# Patient Record
Sex: FEMALE | Race: OTHER | NOT HISPANIC OR LATINO | URBAN - METROPOLITAN AREA
[De-identification: names, ages, dates, MRNs, and addresses within clinical notes are randomized per-mention and may not be internally consistent; named-entity substitution may affect disease eponyms.]

---

## 2021-09-29 ENCOUNTER — EMERGENCY (EMERGENCY)
Facility: HOSPITAL | Age: 31
LOS: 0 days | Discharge: HOME | End: 2021-09-29
Attending: EMERGENCY MEDICINE | Admitting: EMERGENCY MEDICINE
Payer: SELF-PAY

## 2021-09-29 VITALS
DIASTOLIC BLOOD PRESSURE: 75 MMHG | TEMPERATURE: 98 F | SYSTOLIC BLOOD PRESSURE: 128 MMHG | RESPIRATION RATE: 18 BRPM | HEART RATE: 78 BPM | OXYGEN SATURATION: 99 %

## 2021-09-29 VITALS
OXYGEN SATURATION: 100 % | WEIGHT: 119.05 LBS | SYSTOLIC BLOOD PRESSURE: 130 MMHG | HEART RATE: 80 BPM | TEMPERATURE: 98 F | DIASTOLIC BLOOD PRESSURE: 62 MMHG | HEIGHT: 66.54 IN | RESPIRATION RATE: 18 BRPM

## 2021-09-29 DIAGNOSIS — Z20.822 CONTACT WITH AND (SUSPECTED) EXPOSURE TO COVID-19: ICD-10-CM

## 2021-09-29 DIAGNOSIS — Z87.19 PERSONAL HISTORY OF OTHER DISEASES OF THE DIGESTIVE SYSTEM: ICD-10-CM

## 2021-09-29 DIAGNOSIS — R10.9 UNSPECIFIED ABDOMINAL PAIN: ICD-10-CM

## 2021-09-29 DIAGNOSIS — R10.11 RIGHT UPPER QUADRANT PAIN: ICD-10-CM

## 2021-09-29 DIAGNOSIS — R11.0 NAUSEA: ICD-10-CM

## 2021-09-29 DIAGNOSIS — R10.13 EPIGASTRIC PAIN: ICD-10-CM

## 2021-09-29 LAB
ALBUMIN SERPL ELPH-MCNC: 4.1 G/DL — SIGNIFICANT CHANGE UP (ref 3.5–5.2)
ALP SERPL-CCNC: 53 U/L — SIGNIFICANT CHANGE UP (ref 30–115)
ALT FLD-CCNC: 6 U/L — SIGNIFICANT CHANGE UP (ref 0–41)
ANION GAP SERPL CALC-SCNC: 11 MMOL/L — SIGNIFICANT CHANGE UP (ref 7–14)
APPEARANCE UR: CLEAR — SIGNIFICANT CHANGE UP
APTT BLD: 34.7 SEC — SIGNIFICANT CHANGE UP (ref 27–39.2)
AST SERPL-CCNC: 13 U/L — SIGNIFICANT CHANGE UP (ref 0–41)
BASOPHILS # BLD AUTO: 0.04 K/UL — SIGNIFICANT CHANGE UP (ref 0–0.2)
BASOPHILS NFR BLD AUTO: 0.7 % — SIGNIFICANT CHANGE UP (ref 0–1)
BILIRUB SERPL-MCNC: 0.3 MG/DL — SIGNIFICANT CHANGE UP (ref 0.2–1.2)
BILIRUB UR-MCNC: NEGATIVE — SIGNIFICANT CHANGE UP
BUN SERPL-MCNC: 8 MG/DL — LOW (ref 10–20)
CALCIUM SERPL-MCNC: 9.3 MG/DL — SIGNIFICANT CHANGE UP (ref 8.5–10.1)
CHLORIDE SERPL-SCNC: 107 MMOL/L — SIGNIFICANT CHANGE UP (ref 98–110)
CO2 SERPL-SCNC: 23 MMOL/L — SIGNIFICANT CHANGE UP (ref 17–32)
COLOR SPEC: YELLOW — SIGNIFICANT CHANGE UP
CREAT SERPL-MCNC: 0.7 MG/DL — SIGNIFICANT CHANGE UP (ref 0.7–1.5)
DIFF PNL FLD: NEGATIVE — SIGNIFICANT CHANGE UP
EOSINOPHIL # BLD AUTO: 0.2 K/UL — SIGNIFICANT CHANGE UP (ref 0–0.7)
EOSINOPHIL NFR BLD AUTO: 3.4 % — SIGNIFICANT CHANGE UP (ref 0–8)
GLUCOSE SERPL-MCNC: 83 MG/DL — SIGNIFICANT CHANGE UP (ref 70–99)
GLUCOSE UR QL: NEGATIVE MG/DL — SIGNIFICANT CHANGE UP
HCG SERPL QL: NEGATIVE — SIGNIFICANT CHANGE UP
HCT VFR BLD CALC: 33.9 % — LOW (ref 37–47)
HGB BLD-MCNC: 11 G/DL — LOW (ref 12–16)
IMM GRANULOCYTES NFR BLD AUTO: 0.2 % — SIGNIFICANT CHANGE UP (ref 0.1–0.3)
INR BLD: 1.06 RATIO — SIGNIFICANT CHANGE UP (ref 0.65–1.3)
KETONES UR-MCNC: NEGATIVE — SIGNIFICANT CHANGE UP
LEUKOCYTE ESTERASE UR-ACNC: NEGATIVE — SIGNIFICANT CHANGE UP
LIDOCAIN IGE QN: 33 U/L — SIGNIFICANT CHANGE UP (ref 7–60)
LYMPHOCYTES # BLD AUTO: 2.27 K/UL — SIGNIFICANT CHANGE UP (ref 1.2–3.4)
LYMPHOCYTES # BLD AUTO: 38.6 % — SIGNIFICANT CHANGE UP (ref 20.5–51.1)
MCHC RBC-ENTMCNC: 27.6 PG — SIGNIFICANT CHANGE UP (ref 27–31)
MCHC RBC-ENTMCNC: 32.4 G/DL — SIGNIFICANT CHANGE UP (ref 32–37)
MCV RBC AUTO: 85 FL — SIGNIFICANT CHANGE UP (ref 81–99)
MONOCYTES # BLD AUTO: 0.55 K/UL — SIGNIFICANT CHANGE UP (ref 0.1–0.6)
MONOCYTES NFR BLD AUTO: 9.4 % — HIGH (ref 1.7–9.3)
NEUTROPHILS # BLD AUTO: 2.81 K/UL — SIGNIFICANT CHANGE UP (ref 1.4–6.5)
NEUTROPHILS NFR BLD AUTO: 47.7 % — SIGNIFICANT CHANGE UP (ref 42.2–75.2)
NITRITE UR-MCNC: NEGATIVE — SIGNIFICANT CHANGE UP
NRBC # BLD: 0 /100 WBCS — SIGNIFICANT CHANGE UP (ref 0–0)
PH UR: 6.5 — SIGNIFICANT CHANGE UP (ref 5–8)
PLATELET # BLD AUTO: 175 K/UL — SIGNIFICANT CHANGE UP (ref 130–400)
POTASSIUM SERPL-MCNC: 4.1 MMOL/L — SIGNIFICANT CHANGE UP (ref 3.5–5)
POTASSIUM SERPL-SCNC: 4.1 MMOL/L — SIGNIFICANT CHANGE UP (ref 3.5–5)
PROT SERPL-MCNC: 6.1 G/DL — SIGNIFICANT CHANGE UP (ref 6–8)
PROT UR-MCNC: NEGATIVE MG/DL — SIGNIFICANT CHANGE UP
PROTHROM AB SERPL-ACNC: 12.2 SEC — SIGNIFICANT CHANGE UP (ref 9.95–12.87)
RBC # BLD: 3.99 M/UL — LOW (ref 4.2–5.4)
RBC # FLD: 12.4 % — SIGNIFICANT CHANGE UP (ref 11.5–14.5)
SARS-COV-2 RNA SPEC QL NAA+PROBE: SIGNIFICANT CHANGE UP
SODIUM SERPL-SCNC: 141 MMOL/L — SIGNIFICANT CHANGE UP (ref 135–146)
SP GR SPEC: >=1.03 (ref 1.01–1.03)
UROBILINOGEN FLD QL: 0.2 MG/DL — SIGNIFICANT CHANGE UP
WBC # BLD: 5.88 K/UL — SIGNIFICANT CHANGE UP (ref 4.8–10.8)
WBC # FLD AUTO: 5.88 K/UL — SIGNIFICANT CHANGE UP (ref 4.8–10.8)

## 2021-09-29 PROCEDURE — 93010 ELECTROCARDIOGRAM REPORT: CPT

## 2021-09-29 PROCEDURE — 99285 EMERGENCY DEPT VISIT HI MDM: CPT

## 2021-09-29 PROCEDURE — 76705 ECHO EXAM OF ABDOMEN: CPT | Mod: 26

## 2021-09-29 RX ORDER — FAMOTIDINE 10 MG/ML
20 INJECTION INTRAVENOUS DAILY
Refills: 0 | Status: DISCONTINUED | OUTPATIENT
Start: 2021-09-29 | End: 2021-09-29

## 2021-09-29 RX ADMIN — FAMOTIDINE 100 MILLIGRAM(S): 10 INJECTION INTRAVENOUS at 12:24

## 2021-09-29 NOTE — ED PROVIDER NOTE - PATIENT PORTAL LINK FT
You can access the FollowMyHealth Patient Portal offered by [facility] by registering at the following website: [email or web address]. By joining [facility]’s FollowMyHealth portal, you will also be able to view your health information using other applications (apps) compatible with our system.

## 2021-09-29 NOTE — ED PROVIDER NOTE - NS ED ROS FT
Review of Systems:  	•	CONSTITUTIONAL - no fever, no diaphoresis, no chills  	•	SKIN - no rash  	•	HEMATOLOGIC - no bleeding, no bruising    	•	RESPIRATORY - no shortness of breath, no cough  	•	CARDIAC - no chest pain, no palpitations  	•	GI  abd pain, no nausea, no vomiting, no diarrhea, no constipation  	•	GENITO-URINARY - no discharge, no dysuria; no hematuria, no increased urinary frequency  	•	MUSCULOSKELETAL - no joint paint, no swelling, no redness  	•	NEUROLOGIC - no weakness, no headache, no paresthesias, no LOC  	•	PSYCH - no anxiety, non suicidal, non homicidal, no hallucination, no depression

## 2021-09-29 NOTE — ED ADULT NURSE NOTE - NSHOSCREENINGQ1_ED_ALL_ED
[place] [address] [facility] [address]
[place]  [address]
Phone:  [phone]                    ELECTROCARDIOGRAM REPORT      
_______________________________________________________________________________
 
Name:       DEBI MOONEY            Room #:         170-3       ADM IN  
M.R.#:      [id number]     Account #:      [id number]  
Admission:  21    Attend Phys:    Melissa Castillo MD   
Discharge:              Date of Birth:  [birth year]  
                                                          Report #: [id number]
   [id number]
_______________________________________________________________________________
                         [facility] ED
                                       
Test Date:    2021               Test Time:    04:06:11
Pat Name:     DEBI MOONEY       Department:   
Patient ID:   [id number]            Room:         170
Gender:       F                        Technician:   CRISTIAN
:          1983               Requested By: Juve Liriano
Order Number: [id number] MD:   Mathew Barragan
                                 Measurements
Intervals                              Axis          
Rate:         89                       P:            152
NY:           153                      QRS:          -29
QRSD:         89                       T:            152
QT:           412                                    
QTc:          502                                    
                           Interpretive Statements
Sinus or ectopic atrial rhythm
Probable left atrial enlargement
Borderline left axis deviation
Anteroseptal infarct, age indeterminate
Prolonged QT interval
Lead(s) II were not used for morphology analysis
No previous ECG available for comparison
Electronically Signed On 2021 7:35:30 CDT by Mathew Barragan
[email or web address]
 
 
 
 
 
 
 
 
 
 
 
 
 
 
 
 
 
 
  <ELECTRONICALLY SIGNED>
   By: Mathew Barragan MD, [facility]    
  21     [address]35
D: 21 0406                           _____________________________________
T: 21 0406                           Mathew Barragan MD, [facility]      /EPI
No

## 2021-09-29 NOTE — ED PROVIDER NOTE - CLINICAL SUMMARY MEDICAL DECISION MAKING FREE TEXT BOX
evaluated for epigastric pain, RUQ us was nml. lab work was nml, symtpoms improved with pepcid. repeat exam shows nontender abd. Patient to be discharged from ED. Any available test results were discussed with patient and/or family. Verbal instructions given, including instructions to return to ED immediately for any new, worsening, or concerning symptoms. Patient endorsed understanding. Written discharge instructions additionally given, including follow-up plan.

## 2021-09-29 NOTE — ED PROVIDER NOTE - PHYSICAL EXAMINATION
--EXAM--  VITAL SIGNS: I have reviewed vs documented at present.  CONSTITUTIONAL: Well-developed; well-nourished; in no acute distress.   SKIN: Warm and dry, no acute rash.     NECK: Supple; non tender.  CARD: S1, S2, Regular rate and rhythm.   RESP: No wheezes, rales or rhonchi.  ABD: Normal bowel sounds; soft; non-distended; tenderness to epigastric   EXT: Normal ROM.   NEURO: Alert, oriented, grossly unremarkable. Strength 5/5 in all extremities. Sensation intact throughout.  PSYCH: Cooperative, appropriate.

## 2021-09-29 NOTE — ED PROVIDER NOTE - CARE PROVIDER_API CALL
Betsy Marie (MD)  Gastroenterology  [address] [place] [address]  Phone: [phone]  Fax: [phone]  Follow Up Time:

## 2021-09-29 NOTE — ED PROVIDER NOTE - ATTENDING CONTRIBUTION TO CARE
1 day of epigastric abd pain that is constant worse with eating, burning in sensation unrelieved with pepcid and omeprazole associated with nausea no fevers and no urariny complaints. exam shows mild epigastric tenderness. plan is to obtain ruq us and labs.

## 2021-09-29 NOTE — ED PROVIDER NOTE - OBJECTIVE STATEMENT
this is a 32 yo female presents to ed for evaluation of abdominal pain . patient states has history of gastritis and had endoscopy about 5 years ago.

## 2023-08-17 NOTE — ED PROVIDER NOTE - WET READ LAUNCH FT
Detail Level: Detailed Body Location Override (Optional - Billing Will Still Be Based On Selected Body Map Location If Applicable): left inferior forehead X Size Of Lesion In Cm (Optional): 0 Incorporate Mauc In Note: Yes There are no Wet Read(s) to document.

## 2024-01-08 ENCOUNTER — EMERGENCY (EMERGENCY)
Facility: HOSPITAL | Age: 34
LOS: 0 days | Discharge: HOME | End: 2024-01-09
Attending: EMERGENCY MEDICINE
Payer: SELF-PAY

## 2024-01-08 VITALS
TEMPERATURE: 98 F | RESPIRATION RATE: 20 BRPM | WEIGHT: 127.87 LBS | SYSTOLIC BLOOD PRESSURE: 126 MMHG | HEART RATE: 81 BPM | OXYGEN SATURATION: 99 % | DIASTOLIC BLOOD PRESSURE: 74 MMHG

## 2024-01-08 DIAGNOSIS — K21.9 GASTRO-ESOPHAGEAL REFLUX DISEASE WITHOUT ESOPHAGITIS: ICD-10-CM

## 2024-01-08 DIAGNOSIS — R45.851 SUICIDAL IDEATIONS: ICD-10-CM

## 2024-01-08 DIAGNOSIS — E03.9 HYPOTHYROIDISM, UNSPECIFIED: ICD-10-CM

## 2024-01-08 DIAGNOSIS — Z20.822 CONTACT WITH AND (SUSPECTED) EXPOSURE TO COVID-19: ICD-10-CM

## 2024-01-08 DIAGNOSIS — F42.9 OBSESSIVE-COMPULSIVE DISORDER, UNSPECIFIED: ICD-10-CM

## 2024-01-08 LAB
ALBUMIN SERPL ELPH-MCNC: 4.7 G/DL — SIGNIFICANT CHANGE UP (ref 3.5–5.2)
ALBUMIN SERPL ELPH-MCNC: 4.7 G/DL — SIGNIFICANT CHANGE UP (ref 3.5–5.2)
ALP SERPL-CCNC: 62 U/L — SIGNIFICANT CHANGE UP (ref 30–115)
ALP SERPL-CCNC: 62 U/L — SIGNIFICANT CHANGE UP (ref 30–115)
ALT FLD-CCNC: 6 U/L — SIGNIFICANT CHANGE UP (ref 0–41)
ALT FLD-CCNC: 6 U/L — SIGNIFICANT CHANGE UP (ref 0–41)
ANION GAP SERPL CALC-SCNC: 12 MMOL/L — SIGNIFICANT CHANGE UP (ref 7–14)
ANION GAP SERPL CALC-SCNC: 12 MMOL/L — SIGNIFICANT CHANGE UP (ref 7–14)
APAP SERPL-MCNC: <5 UG/ML — LOW (ref 10–30)
APAP SERPL-MCNC: <5 UG/ML — LOW (ref 10–30)
AST SERPL-CCNC: 15 U/L — SIGNIFICANT CHANGE UP (ref 0–41)
AST SERPL-CCNC: 15 U/L — SIGNIFICANT CHANGE UP (ref 0–41)
BASOPHILS # BLD AUTO: 0.06 K/UL — SIGNIFICANT CHANGE UP (ref 0–0.2)
BASOPHILS # BLD AUTO: 0.06 K/UL — SIGNIFICANT CHANGE UP (ref 0–0.2)
BASOPHILS NFR BLD AUTO: 0.7 % — SIGNIFICANT CHANGE UP (ref 0–1)
BASOPHILS NFR BLD AUTO: 0.7 % — SIGNIFICANT CHANGE UP (ref 0–1)
BILIRUB SERPL-MCNC: <0.2 MG/DL — SIGNIFICANT CHANGE UP (ref 0.2–1.2)
BILIRUB SERPL-MCNC: <0.2 MG/DL — SIGNIFICANT CHANGE UP (ref 0.2–1.2)
BUN SERPL-MCNC: 11 MG/DL — SIGNIFICANT CHANGE UP (ref 10–20)
BUN SERPL-MCNC: 11 MG/DL — SIGNIFICANT CHANGE UP (ref 10–20)
CALCIUM SERPL-MCNC: 9.1 MG/DL — SIGNIFICANT CHANGE UP (ref 8.4–10.5)
CALCIUM SERPL-MCNC: 9.1 MG/DL — SIGNIFICANT CHANGE UP (ref 8.4–10.5)
CHLORIDE SERPL-SCNC: 103 MMOL/L — SIGNIFICANT CHANGE UP (ref 98–110)
CHLORIDE SERPL-SCNC: 103 MMOL/L — SIGNIFICANT CHANGE UP (ref 98–110)
CO2 SERPL-SCNC: 26 MMOL/L — SIGNIFICANT CHANGE UP (ref 17–32)
CO2 SERPL-SCNC: 26 MMOL/L — SIGNIFICANT CHANGE UP (ref 17–32)
CREAT SERPL-MCNC: 0.8 MG/DL — SIGNIFICANT CHANGE UP (ref 0.7–1.5)
CREAT SERPL-MCNC: 0.8 MG/DL — SIGNIFICANT CHANGE UP (ref 0.7–1.5)
EGFR: 98 ML/MIN/1.73M2 — SIGNIFICANT CHANGE UP
EGFR: 98 ML/MIN/1.73M2 — SIGNIFICANT CHANGE UP
EOSINOPHIL # BLD AUTO: 0.7 K/UL — SIGNIFICANT CHANGE UP (ref 0–0.7)
EOSINOPHIL # BLD AUTO: 0.7 K/UL — SIGNIFICANT CHANGE UP (ref 0–0.7)
EOSINOPHIL NFR BLD AUTO: 7.8 % — SIGNIFICANT CHANGE UP (ref 0–8)
EOSINOPHIL NFR BLD AUTO: 7.8 % — SIGNIFICANT CHANGE UP (ref 0–8)
ETHANOL SERPL-MCNC: <10 MG/DL — SIGNIFICANT CHANGE UP
ETHANOL SERPL-MCNC: <10 MG/DL — SIGNIFICANT CHANGE UP
FLUAV AG NPH QL: SIGNIFICANT CHANGE UP
FLUAV AG NPH QL: SIGNIFICANT CHANGE UP
FLUBV AG NPH QL: SIGNIFICANT CHANGE UP
FLUBV AG NPH QL: SIGNIFICANT CHANGE UP
GLUCOSE SERPL-MCNC: 100 MG/DL — HIGH (ref 70–99)
GLUCOSE SERPL-MCNC: 100 MG/DL — HIGH (ref 70–99)
HCG SERPL QL: NEGATIVE — SIGNIFICANT CHANGE UP
HCG SERPL QL: NEGATIVE — SIGNIFICANT CHANGE UP
HCT VFR BLD CALC: 38.2 % — SIGNIFICANT CHANGE UP (ref 37–47)
HCT VFR BLD CALC: 38.2 % — SIGNIFICANT CHANGE UP (ref 37–47)
HGB BLD-MCNC: 12.8 G/DL — SIGNIFICANT CHANGE UP (ref 12–16)
HGB BLD-MCNC: 12.8 G/DL — SIGNIFICANT CHANGE UP (ref 12–16)
IMM GRANULOCYTES NFR BLD AUTO: 0.1 % — SIGNIFICANT CHANGE UP (ref 0.1–0.3)
IMM GRANULOCYTES NFR BLD AUTO: 0.1 % — SIGNIFICANT CHANGE UP (ref 0.1–0.3)
LYMPHOCYTES # BLD AUTO: 3.23 K/UL — SIGNIFICANT CHANGE UP (ref 1.2–3.4)
LYMPHOCYTES # BLD AUTO: 3.23 K/UL — SIGNIFICANT CHANGE UP (ref 1.2–3.4)
LYMPHOCYTES # BLD AUTO: 35.9 % — SIGNIFICANT CHANGE UP (ref 20.5–51.1)
LYMPHOCYTES # BLD AUTO: 35.9 % — SIGNIFICANT CHANGE UP (ref 20.5–51.1)
MCHC RBC-ENTMCNC: 29.1 PG — SIGNIFICANT CHANGE UP (ref 27–31)
MCHC RBC-ENTMCNC: 29.1 PG — SIGNIFICANT CHANGE UP (ref 27–31)
MCHC RBC-ENTMCNC: 33.5 G/DL — SIGNIFICANT CHANGE UP (ref 32–37)
MCHC RBC-ENTMCNC: 33.5 G/DL — SIGNIFICANT CHANGE UP (ref 32–37)
MCV RBC AUTO: 86.8 FL — SIGNIFICANT CHANGE UP (ref 81–99)
MCV RBC AUTO: 86.8 FL — SIGNIFICANT CHANGE UP (ref 81–99)
MONOCYTES # BLD AUTO: 0.46 K/UL — SIGNIFICANT CHANGE UP (ref 0.1–0.6)
MONOCYTES # BLD AUTO: 0.46 K/UL — SIGNIFICANT CHANGE UP (ref 0.1–0.6)
MONOCYTES NFR BLD AUTO: 5.1 % — SIGNIFICANT CHANGE UP (ref 1.7–9.3)
MONOCYTES NFR BLD AUTO: 5.1 % — SIGNIFICANT CHANGE UP (ref 1.7–9.3)
NEUTROPHILS # BLD AUTO: 4.53 K/UL — SIGNIFICANT CHANGE UP (ref 1.4–6.5)
NEUTROPHILS # BLD AUTO: 4.53 K/UL — SIGNIFICANT CHANGE UP (ref 1.4–6.5)
NEUTROPHILS NFR BLD AUTO: 50.4 % — SIGNIFICANT CHANGE UP (ref 42.2–75.2)
NEUTROPHILS NFR BLD AUTO: 50.4 % — SIGNIFICANT CHANGE UP (ref 42.2–75.2)
NRBC # BLD: 0 /100 WBCS — SIGNIFICANT CHANGE UP (ref 0–0)
NRBC # BLD: 0 /100 WBCS — SIGNIFICANT CHANGE UP (ref 0–0)
PLATELET # BLD AUTO: 178 K/UL — SIGNIFICANT CHANGE UP (ref 130–400)
PLATELET # BLD AUTO: 178 K/UL — SIGNIFICANT CHANGE UP (ref 130–400)
PMV BLD: 12.1 FL — HIGH (ref 7.4–10.4)
PMV BLD: 12.1 FL — HIGH (ref 7.4–10.4)
POTASSIUM SERPL-MCNC: 3.5 MMOL/L — SIGNIFICANT CHANGE UP (ref 3.5–5)
POTASSIUM SERPL-MCNC: 3.5 MMOL/L — SIGNIFICANT CHANGE UP (ref 3.5–5)
POTASSIUM SERPL-SCNC: 3.5 MMOL/L — SIGNIFICANT CHANGE UP (ref 3.5–5)
POTASSIUM SERPL-SCNC: 3.5 MMOL/L — SIGNIFICANT CHANGE UP (ref 3.5–5)
PROT SERPL-MCNC: 6.5 G/DL — SIGNIFICANT CHANGE UP (ref 6–8)
PROT SERPL-MCNC: 6.5 G/DL — SIGNIFICANT CHANGE UP (ref 6–8)
RBC # BLD: 4.4 M/UL — SIGNIFICANT CHANGE UP (ref 4.2–5.4)
RBC # BLD: 4.4 M/UL — SIGNIFICANT CHANGE UP (ref 4.2–5.4)
RBC # FLD: 12.6 % — SIGNIFICANT CHANGE UP (ref 11.5–14.5)
RBC # FLD: 12.6 % — SIGNIFICANT CHANGE UP (ref 11.5–14.5)
RSV RNA NPH QL NAA+NON-PROBE: SIGNIFICANT CHANGE UP
RSV RNA NPH QL NAA+NON-PROBE: SIGNIFICANT CHANGE UP
SALICYLATES SERPL-MCNC: <0.3 MG/DL — LOW (ref 4–30)
SALICYLATES SERPL-MCNC: <0.3 MG/DL — LOW (ref 4–30)
SARS-COV-2 RNA SPEC QL NAA+PROBE: SIGNIFICANT CHANGE UP
SARS-COV-2 RNA SPEC QL NAA+PROBE: SIGNIFICANT CHANGE UP
SODIUM SERPL-SCNC: 141 MMOL/L — SIGNIFICANT CHANGE UP (ref 135–146)
SODIUM SERPL-SCNC: 141 MMOL/L — SIGNIFICANT CHANGE UP (ref 135–146)
WBC # BLD: 8.99 K/UL — SIGNIFICANT CHANGE UP (ref 4.8–10.8)
WBC # BLD: 8.99 K/UL — SIGNIFICANT CHANGE UP (ref 4.8–10.8)
WBC # FLD AUTO: 8.99 K/UL — SIGNIFICANT CHANGE UP (ref 4.8–10.8)
WBC # FLD AUTO: 8.99 K/UL — SIGNIFICANT CHANGE UP (ref 4.8–10.8)

## 2024-01-08 PROCEDURE — 80307 DRUG TEST PRSMV CHEM ANLYZR: CPT

## 2024-01-08 PROCEDURE — 93005 ELECTROCARDIOGRAM TRACING: CPT

## 2024-01-08 PROCEDURE — 99285 EMERGENCY DEPT VISIT HI MDM: CPT | Mod: 25

## 2024-01-08 PROCEDURE — 36415 COLL VENOUS BLD VENIPUNCTURE: CPT

## 2024-01-08 PROCEDURE — 84703 CHORIONIC GONADOTROPIN ASSAY: CPT

## 2024-01-08 PROCEDURE — 85025 COMPLETE CBC W/AUTO DIFF WBC: CPT

## 2024-01-08 PROCEDURE — 93010 ELECTROCARDIOGRAM REPORT: CPT

## 2024-01-08 PROCEDURE — 99285 EMERGENCY DEPT VISIT HI MDM: CPT

## 2024-01-08 PROCEDURE — 80053 COMPREHEN METABOLIC PANEL: CPT

## 2024-01-08 PROCEDURE — 90792 PSYCH DIAG EVAL W/MED SRVCS: CPT | Mod: 95,GC

## 2024-01-08 PROCEDURE — 0241U: CPT

## 2024-01-08 RX ORDER — LEVOTHYROXINE SODIUM 125 MCG
0 TABLET ORAL
Refills: 0 | DISCHARGE

## 2024-01-08 RX ORDER — SUCRALFATE 1 G
1 TABLET ORAL
Refills: 0 | DISCHARGE

## 2024-01-08 NOTE — ED PROVIDER NOTE - ATTENDING APP SHARED VISIT CONTRIBUTION OF CARE
33-year-old female, no past medical history, no psychiatric history comes in complaining of 10-day history of intrusive thoughts/thoughts of death/thoughts of killing herself and/for her brother.  Denies hearing voices or seeing things.  No headache, vision changes, chest pain/shortness of breath, abdominal pain, difficulty ambulating.  On exam, pt in NAD, AAOx3, head NC/AT, CN II-XII intact, lungs CTA B/L, CV S1S2 regular, abdomen soft/NT/ND/(+)BS, ext (-) edema, motor 5/5x4, sensation intact.  Will do labs, EKG, psych consult, and reevaluate.

## 2024-01-08 NOTE — ED ADULT TRIAGE NOTE - CHIEF COMPLAINT QUOTE
Patient states "I have been having death thoughts for one and a half weeks". Patient endorsing suicidal ideations, denies any self harm or injury at this time

## 2024-01-08 NOTE — ED BEHAVIORAL HEALTH ASSESSMENT NOTE - DESCRIPTION
BIB  for intrusive thoughts of harming brother.    Vital Signs Last 24 Hrs  T(C): 36.4 (08 Jan 2024 17:06), Max: 36.4 (08 Jan 2024 17:06)  T(F): 97.6 (08 Jan 2024 17:06), Max: 97.6 (08 Jan 2024 17:06)  HR: 81 (08 Jan 2024 17:06) (81 - 81)  BP: 126/74 (08 Jan 2024 17:06) (126/74 - 126/74)  BP(mean): --  RR: 20 (08 Jan 2024 17:06) (20 - 20)  SpO2: 99% (08 Jan 2024 17:06) (99% - 99%)    Parameters below as of 08 Jan 2024 17:06  Patient On (Oxygen Delivery Method): room air see above, pt not currently working and used to work as  thyroid condition see above, pt not currently working and used to work as /

## 2024-01-08 NOTE — ED BEHAVIORAL HEALTH ASSESSMENT NOTE - HPI (INCLUDE ILLNESS QUALITY, SEVERITY, DURATION, TIMING, CONTEXT, MODIFYING FACTORS, ASSOCIATED SIGNS AND SYMPTOMS)
Patient is a 33 year-old female, , domiciled with , unemployed, pmh thyroid condition, no pph, no hx of suicide attempt, no hx of inpatient psychiatric admissions, who presents to ED      Collateral -   For the last few days, patient has been having different types of thoughts like she will die, etc. States that she spends a lot of time watching sensitive videos (people getting harmed, dying, animals getting harmed) and speaks about them. States she has a lot of concern for poor people. For the last 5 years, patient has not used medications for thyroid. She was given meds 2 months ago and since then the behavior changed. Has joined [employer].  For last 7 days, has been getting thoughts that she won't tell her . Has thoughts of wanting to hurt herself and hurt her brother.  Not concerned that she will hurt someone, worried about her thoughts and obsessive watching phone. Patient is a 33 year-old female, , domiciled with , unemployed, pmh thyroid condition, no pph, no hx of suicide attempt, no hx of inpatient psychiatric admissions, who presents to ED      Collateral -   For the last few days, patient has been having different types of thoughts like she will die, etc. States that she spends a lot of time watching sensitive videos (people getting harmed, dying, animals getting harmed) and speaks about them. States she has a lot of concern for poor people. For the last 5 years, patient has not used medications for thyroid. She was given meds 2 months ago and since then the behavior changed. Has joined [employer].  For last 7 days, has been getting thoughts that she won't tell her . Has thoughts of wanting to hurt herself and hurt her brother.  Not concerned that she will hurt someone, worried about her thoughts and obsessive watching phone. Patient is a 33 year-old female, , domiciled with , unemployed, pmh thyroid condition, no pph, no hx of suicide attempt, no hx of inpatient psychiatric admissions, who presents to ED    Patient presents alert and oriented to person, place, time and situation. Easily engages in interivew. States that she has intrustive thoughts about her  who she is close with. Brother lives in [place] and is a musician.     Prayer - if I don't pray or do something, something will happen.  My brain says that if I don't do xyz, soeone will die. Even I am keepign the cup somewhere, etc.    Initially felt it was overthinking, but is unable to stop it.     My brain is not at rest, even when I'm talking to you. Burning sensation in back.     Used to live a very busy life () but now she has a lot of time, on her phone,  is working all the time.     When she sees murders or accidents, she imagines that loved ones are going to die, etc. Now feels very distrubed and as if I should kill myself.     now praying for god to kill you or others (brother, ) - ego dystonic.  thoughts are very distressing, not able to be normal  unable to get immediate appt  sweat due to being anxious    Denies issues with sleep, appetite, Endorses depressed mood, feeling sad, hopeless, amotivation, low energy, passive suicidal ideation. + anxiety  Denies AVH, paranoia.     Unable to laugh    Patient provides permission to speak with collateral.   Collateral -   For the last few days, patient has been having different types of thoughts like she will die, etc. States that she spends a lot of time watching sensitive videos (people getting harmed, dying, animals getting harmed) and speaks about them. States she has a lot of concern for poor people. For the last 5 years, patient has not used medications for thyroid. She was given meds 2 months ago and since then the behavior changed. Has joined [employer].  For last 7 days, has been getting thoughts that she won't tell her . Has thoughts of wanting to hurt herself and hurt her brother.  Not concerned that she will hurt someone, worried about her thoughts and obsessive watching phone. Patient is a 33 year-old female, , domiciled with , unemployed, pmh thyroid condition, no pph, no hx of suicide attempt, no hx of inpatient psychiatric admissions, who presents to ED    Patient presents alert and oriented to person, place, time and situation. Easily engages in interivew. States that she has intrustive thoughts about her  who she is close with. Brother lives in [place] and is a musician.     Prayer - if I don't pray or do something, something will happen.  My brain says that if I don't do xyz, soeone will die. Even I am keepign the cup somewhere, etc.    Initially felt it was overthinking, but is unable to stop it.     My brain is not at rest, even when I'm talking to you. Burning sensation in back.     Used to live a very busy life () but now she has a lot of time, on her phone,  is working all the time.     When she sees murders or accidents, she imagines that loved ones are going to die, etc. Now feels very distrubed and as if I should kill myself.     now praying for god to kill you or others (brother, ) - ego dystonic.  thoughts are very distressing, not able to be normal  unable to get immediate appt  sweat due to being anxious    Denies issues with sleep, appetite, Endorses depressed mood, feeling sad, hopeless, amotivation, low energy, passive suicidal ideation. + anxiety  Denies AVH, paranoia.     Unable to laugh    Patient provides permission to speak with collateral.   Collateral -   For the last few days, patient has been having different types of thoughts like she will die, etc. States that she spends a lot of time watching sensitive videos (people getting harmed, dying, animals getting harmed) and speaks about them. States she has a lot of concern for poor people. For the last 5 years, patient has not used medications for thyroid. She was given meds 2 months ago and since then the behavior changed. Has joined [employer].  For last 7 days, has been getting thoughts that she won't tell her . Has thoughts of wanting to hurt herself and hurt her brother.  Not concerned that she will hurt someone, worried about her thoughts and obsessive watching phone. Patient is a 33 year-old female, , domiciled with  and mother in law, unemployed, pmh thyroid condition, no pph, no hx of suicide attempt, no hx of inpatient psychiatric admissions, who presents to ED with intrusive thoughts. Telepsychiatry consulted for mental health evaluation.    Patient presents alert and oriented to person, place, time and situation. Easily engages in interview. States that she has intrusive thoughts that have become distressing and difficult to distract from. She has thoughts that if she does not pray or do certain things in particular ways (for ex not placing a cut in a specific way), something bad will happen to herself or loved ones. She describes intense worry for her  and brother (lives in [facility]). States she is unable to turn off her brain and feels a burning sensation in the back of her head. To distract herself and busy her time, she has begun to watch her phone often and when she sees murders or accidents, she images that her loved ones are dying. Recently, pt states that she has been so disturbed by her thoughts, that she has started to think that it is better for her to go to sleep and not wake up. She denies suicide plan or true intent. However, she states that she has been praying to God to herself or brother or .     Endorses depressed mood, feeling sad, hopeless, amotivation, low energy, passive suicidal ideation; denies issues with sleep or appetite. Also describes anxiety and not able to calm her worries. Denies AVH, paranoia.     Patient states that she used to work as a  and live a very busy life with travel and work. After getting  about 2 years ago, she has found it difficult to be busy; her  is often working and she finds herself using her phone a lot.     Patient provides permission to speak with collateral.   Collateral - , Cassy Cleveland  For the last 2 weeks, patient has been having thoughts of dying, others dying. She is concerned and worried about poor people and animals dying. She also worries about her brother and others but also has thoughts of hurting her brother. States that patient has not been very open with him about the full content of her thoughts over the last few days. Reported pt recently started synthroid 2 months ago, otherwise no other medications. States she started a taekwondo class to keep busy. States that he is not concerned that she will hurt someone, but worries her thoughts will worsen. Patient is a 33 year-old female, , domiciled with  and mother in law, unemployed, pmh thyroid condition, no pph, no hx of suicide attempt, no hx of inpatient psychiatric admissions, who presents to ED with intrusive thoughts. Telepsychiatry consulted for mental health evaluation.    Patient presents alert and oriented to person, place, time and situation. Easily engages in interview. States that she has intrusive thoughts that have become distressing and difficult to distract from. She has thoughts that if she does not pray or do certain things in particular ways (for ex not placing a cut in a specific way), something bad will happen to herself or loved ones. She describes intense worry for her  and brother (lives in [facility]). States she is unable to turn off her brain and feels a burning sensation in the back of her head. To distract herself and busy her time, she has begun to watch her phone often and when she sees murders or accidents, she images that her loved ones are dying. Recently, pt states that she has been so disturbed by her thoughts, that she has started to think that it is better for her to go to sleep and not wake up. She denies suicide plan or true intent. However, she states that she has been praying to God to herself or brother or .     Endorses depressed mood, feeling sad, hopeless, amotivation, low energy, passive suicidal ideation; denies issues with sleep or appetite. Also describes anxiety and not able to calm her worries. Denies AVH, paranoia.     Patient states that she used to work as a  and live a very busy life with travel and work. After getting  about 2 years ago, she has found it difficult to be busy; her  is often working and she finds herself using her phone a lot.     Patient provides permission to speak with collateral.   Collateral - , Cassy Cleveland  For the last 2 weeks, patient has been having thoughts of dying, others dying. She is concerned and worried about poor people and animals dying. She also worries about her brother and others but also has thoughts of hurting her brother. States that patient has not been very open with him about the full content of her thoughts over the last few days. Reported pt recently started synthroid 2 months ago, otherwise no other medications. States she started a taekwondo class to keep busy. States that he is not concerned that she will hurt someone, but worries her thoughts will worsen. Patient is a 33 year-old female, , domiciled with  and mother in law, unemployed, pmh thyroid condition, no pph, no hx of suicide attempt, no hx of inpatient psychiatric admissions, who presents to ED with intrusive thoughts. Telepsychiatry consulted for mental health evaluation.    Patient presents alert and oriented to person, place, time and situation. Easily engages in interview. States that she has intrusive thoughts that have become distressing and difficult to distract from. She has thoughts that if she does not pray or do certain things in particular ways (for ex not placing a cup in a specific way), something bad will happen to herself or loved ones. She describes intense worry for her  and brother (lives in [facility]). States she is unable to turn off her brain and feels a burning sensation in the back of her head. To distract herself and busy her time, she has begun to watch her phone often and when she sees murders or accidents and has mental images that her loved ones are dying. Recently, pt states that she has been so disturbed by her thoughts, that she has started to think that it is better for her to go to sleep and not wake up. However, she denies suicide plan or true intent.    Endorses depressed mood, feeling sad, hopeless, amotivation, low energy, passive suicidal ideation; denies issues with sleep or appetite. Also describes anxiety and not able to calm her worries. Denies AVH, paranoia.     Patient states that she used to work as a  and live a very busy life with travel and work. After getting  about 2 years ago, she has found it difficult to be busy; her  is often working and she finds herself using her phone a lot.     The pt was offered voluntary psychiatric admission, but declined, and states she is amenable to outpt referrals.    Patient provides permission to speak with collateral.   Collateral - , Cassy Cleveland  For the last 2 weeks, patient has been having thoughts of dying herself and of others dying that have caused her distress. She is concerned and worried about poor people and animals dying. She also worries about her brother and others but also has thoughts of hurting her brother. States that patient has not been very open with him about the full content of her thoughts over the last few days. Reported pt recently started synthroid 2 months ago, otherwise no other medications. States she started a taekwondo class to keep busy. States that he is not concerned that she will hurt someone or herself and confirms he will bring pt back to the ED if her symptoms worsen or if he has any safety concerns. Patient is a 33 year-old female, , domiciled with  and mother in law, unemployed, pmh thyroid condition, no pph, no hx of suicide attempt, no hx of inpatient psychiatric admissions, who presents to ED with intrusive thoughts. Telepsychiatry consulted for mental health evaluation.    Patient presents alert and oriented to person, place, time and situation. Easily engages in interview. States that she has intrusive thoughts that have become distressing and difficult to distract from. She has thoughts that if she does not pray or do certain things in particular ways (for ex not placing a cup in a specific way), something bad will happen to herself or loved ones. She describes intense worry for her  and brother (lives in [facility]). States she is unable to turn off her brain and feels a burning sensation in the back of her head. To distract herself and busy her time, she has begun to watch her phone often and when she sees murders or accidents and has mental images that her loved ones are dying. Recently, pt states that she has been so disturbed by her thoughts, that she has started to think that it is better for her to go to sleep and not wake up. However, she denies suicide plan or true intent.    Endorses depressed mood, feeling sad, hopeless, amotivation, low energy, passive suicidal ideation; denies issues with sleep or appetite. Also describes anxiety and not able to calm her worries. Denies AVH, paranoia.     Patient states that she used to work as a  and live a very busy life with travel and work. After getting  about 2 years ago, she has found it difficult to be busy; her  is often working and she finds herself using her phone a lot.     The pt was offered voluntary psychiatric admission, but declined, and states she is amenable to outpt referrals.    Patient provides permission to speak with collateral.   Collateral - , Cassy Cleveland  For the last 2 weeks, patient has been having thoughts of dying herself and of others dying that have caused her distress. She is concerned and worried about poor people and animals dying. She also worries about her brother and others but also has thoughts of hurting her brother. States that patient has not been very open with him about the full content of her thoughts over the last few days. Reported pt recently started synthroid 2 months ago, otherwise no other medications. States she started a taekwondo class to keep busy. States that he is not concerned that she will hurt someone or herself and confirms he will bring pt back to the ED if her symptoms worsen or if he has any safety concerns.

## 2024-01-08 NOTE — ED BEHAVIORAL HEALTH ASSESSMENT NOTE - NSPRESENTSXS_PSY_ALL_CORE
Depressed mood/Anhedonia/Severe anxiety, agitation or panic Depressed mood/Anhedonia/Hopelessness or despair/Severe anxiety, agitation or panic

## 2024-01-08 NOTE — ED PROVIDER NOTE - CLINICAL SUMMARY MEDICAL DECISION MAKING FREE TEXT BOX
33-year-old female, no past medical history, no psychiatric history comes in complaining of 10-day history of intrusive thoughts/thoughts of death/thoughts of killing herself and/for her brother  medically cleared,  evaluated by psych -  cleared for outpt follow up

## 2024-01-08 NOTE — ED PROVIDER NOTE - PATIENT PORTAL LINK FT
You can access the FollowMyHealth Patient Portal offered by [facility] by registering at the following website: [email or web address]. By joining [employer]’s FollowMyHealth portal, you will also be able to view your health information using other applications (apps) compatible with our system. You can access the FollowMyHealth Patient Portal offered by [facility] by registering at the following website: [email or web address]. By joining [employer]’s FollowMyHealth portal, you will also be able to view your health information using other applications (apps) compatible with our system.

## 2024-01-08 NOTE — ED BEHAVIORAL HEALTH ASSESSMENT NOTE - SUMMARY
Patient is a 33 year-old female, , domiciled with  and mother in law, unemployed, pmh thyroid condition, no pph, no hx of suicide attempt, no hx of inpatient psychiatric admissions, who presents to ED with intrusive thoughts. Telepsychiatry consulted for mental health evaluation. Patient is a 33 year-old female, , domiciled with  and mother in law, unemployed, pmh thyroid condition, no pph, no hx of suicide attempt, no hx of inpatient psychiatric admissions, who presents to ED with intrusive thoughts. Telepsychiatry consulted for mental health evaluation.    Patient presents with obsessive, intrusive thoughts (has thoughts of her loved ones or herself getting hurt or dying), leading to anxious and depressed mood, and passive suicidal ideation. Thoughts are constant and are worsened by images of others being ill, accidents, etc. Patient is actively working to distract herself and build coping skills, but thoughts are very distressing and continuous. She denies active suicidal ideation, thoughts of harming self or others, and sxs of psychosis. She is able to safety plan, future oriented, motivated to improve and is well supported by her  who denied acute safety concerns. Patient is open to connection to outpt care. At this time, there are no psychiatric contraindications to discharge.    Plan  - treat and release  - f/u appt on 1/19 at 10 am at [facility], [address] [place] [address]. Patient is a 33 year-old female, , domiciled with  and mother in law, unemployed, pmh thyroid condition, no pph, no hx of suicide attempt, no hx of inpatient psychiatric admissions, who presents to ED with intrusive thoughts. Telepsychiatry consulted for mental health evaluation.    Patient presents with obsessive, intrusive thoughts (has thoughts of her loved ones or herself getting hurt or dying), leading to anxious and depressed mood, and passive suicidal ideation. Thoughts are constant and are worsened by images of others being ill, accidents, etc. Patient is actively working to distract herself and build coping skills, but thoughts are very distressing and continuous. She denies active suicidal ideation, thoughts of harming self or others, and sxs of psychosis. She is able to safety plan, future oriented, motivated to improve and is well supported by her  who denied acute safety concerns. Patient is open to connection to outpt care. At this time, there are no psychiatric contraindications to discharge.    Plan  - treat and release  - f/u appt on 1/19 at 10 am at [facility], [address] [place] [address]. Patient is a 33 year-old female, , domiciled with  and mother in law, unemployed, pmh thyroid condition, no pph, no hx of suicide attempt, no hx of inpatient psychiatric admissions, who presents to ED with intrusive thoughts. Telepsychiatry consulted for mental health evaluation.    Patient presents with obsessive, ego-dystonic intrusive thoughts (has thoughts of her loved ones or herself getting hurt or dying), leading to anxious and depressed mood, and passive suicidal ideation. Thoughts are constant and are worsened by images of others being ill, accidents, etc. Patient is actively working to distract herself and to build coping skills, but her thoughts have persisted and she came to the ED to be connected with psychaitric care. She denies active suicidal ideation, thoughts of harming self or others, and sxs of psychosis. She is able to safety plan, presents as future oriented and help-seeking, is motivated to improve and is well supported by her  who denied acute safety concerns.     Given her risk/mitigating factors, the pt presents as a chronic risk of harm, but does not currently present as an imminent risk of harm to self or other at this time and seems able to safely care for herself in the community. As such, she does not meet criteria for involuntary psychiatric hospitalization and declined voluntary admission when offered. Instead, pt's risk of harm was mitigated by engaging her in safety planning, setting her up with an intake appt at the [facility] on 1/19 at 10 AM, and instructing her to return to the ED for acute safety concerns. At this time, there are no psychiatric contraindications to discharge.    Plan  - treat and release  - f/u appt on 1/19 at 10 am at [facility], [address] [place] [address]. Patient is a 33 year-old female, , domiciled with  and mother in law, unemployed, pmh thyroid condition, no pph, no hx of suicide attempt, no hx of inpatient psychiatric admissions, who presents to ED with intrusive thoughts. Telepsychiatry consulted for mental health evaluation.    Patient presents with obsessive, ego-dystonic intrusive thoughts (has thoughts of her loved ones or herself getting hurt or dying), leading to anxious and depressed mood, and passive suicidal ideation. Thoughts are constant and are worsened by images of others being ill, accidents, etc. Patient is actively working to distract herself and to build coping skills, but her thoughts have persisted and she came to the ED to be connected with psychaitric care. She denies active suicidal ideation, thoughts of harming self or others, and sxs of psychosis. She is able to safety plan, presents as future oriented and help-seeking, is motivated to improve and is well supported by her  who denied acute safety concerns.     Given her risk/mitigating factors, the pt presents as a chronic risk of harm, but does not currently present as an imminent risk of harm to self or other at this time and seems able to safely care for herself in the community. As such, she does not meet criteria for involuntary psychiatric hospitalization and declined voluntary admission when offered. Instead, pt's risk of harm was mitigated by engaging her in safety planning, setting her up with an intake appt at the [facility] on 1/19 at 10 AM, and instructing her to return to the ED for acute safety concerns. At this time, there are no psychiatric contraindications to discharge.    Plan  - treat and release  - f/u appt on 1/19 at 10 am at [facility], [address] [place] [address].

## 2024-01-08 NOTE — ED BEHAVIORAL HEALTH NOTE - BEHAVIORAL HEALTH NOTE
==================       PRE-HOSPITAL COURSE       ==================       Name: Malathi Pope      SOURCE: Ed documentation      DETAILS: BIBSelf intrusive thoughts related to wanting to harm brother.     ============       ED COURSE       ============       SOURCE: Ed documentation     ARRIVAL: BIBSelf and has been compliant and cooperative with the triage process at the time of this documentation.       BELONGINGS:      Per Ed documentation, the patient is in a gown with a 1:1. No contraband reported.      BEHAVIOR:        The patient was brought to the ED reportedly for worsening depression. Patient reportedly is AAOx3. The Ed documentation reports the patient appears to have decent hygiene. The Ed documentation reports the patient is not displaying agitation/aggression towards staff or others while in the ED. There are no visible marks on the patient's body. Pt reportedly has persistent tremors that cause chronic pain and endorses SI due to the tremors.      TREATMENT:      None      VISITORS:      None      -----------------------------------------------      COVID Exposure Screen- collateral (i.e. third-party, chart review, belongings, etc; include EMS and ED staff)      ---------------------------------------------------      1. Has the patient had a COVID-19 test in the last 90 days? Unknown.      2. Has the patient tested positive for COVID-19 antibodies? Unknown.      3.Has the patient received 2 doses of the COVID-19 vaccine?  Unknown.      4. In the past 10 days, has the patient been around anyone with a positive COVID-19 test?* Unknown. ==================       PRE-HOSPITAL COURSE       ==================       Name: Malathi Pope      SOURCE: Ed documentation      DETAILS: JOSLYNSelroxana intrusive thoughts related to wanting to harm brother. No psych hx, no medical hx. No Si    ============       ED COURSE       ============       SOURCE: Ed documentation     ARRIVAL: BIBSelf and has been compliant and cooperative with the triage process at the time of this documentation.       BELONGINGS:      Per Ed documentation, the patient is in a gown with a 1:1. No contraband reported.      BEHAVIOR:        The patient was brought to the ED reportedly for worsening depression. Patient reportedly is AAOx3. The Ed documentation reports the patient appears to have decent hygiene. The Ed documentation reports the patient is not displaying agitation/aggression towards staff or others while in the ED. There are no visible marks on the patient's body.     TREATMENT:      None      VISITORS:      None      -----------------------------------------------      COVID Exposure Screen- collateral (i.e. third-party, chart review, belongings, etc; include EMS and ED staff)      ---------------------------------------------------      1. Has the patient had a COVID-19 test in the last 90 days? Unknown.      2. Has the patient tested positive for COVID-19 antibodies? Unknown.      3.Has the patient received 2 doses of the COVID-19 vaccine?  Unknown.      4. In the past 10 days, has the patient been around anyone with a positive COVID-19 test?* Unknown.

## 2024-01-08 NOTE — ED PROVIDER NOTE - NSFOLLOWUPINSTRUCTIONS_ED_ALL_ED_FT
Please make sure to follow up with your primary care doctor in 3 days.    Please make sure to keep up the appointment that you have already established with the outpatient mental health clinic.      Safety Plan:  Step 1: Identify warning signs (thoughts, images, mood, situation, behavior) that a crisis may be developing	.  Warning Sign 1:	seeing others who are ill  Warning Sign 2:	when I see an accident  Step 2: Identify internal coping strategies - Things I can do to take my mind off my problems without contacting another person (relaxation technique, physical activity)	.  Internal Coping Strategy 1:	meditation  Internal Coping Strategy 2:	try to reject thoughts and say that this is not what I want  Internal Coping Strategy 3:	prayer  Step 3: People and social settings that provide distraction	.  Name:	  Name:	Mother  Place:	Home  Step 4: People whom I can ask for help	.  Name:	  Name:	Mother  Step 5: Professionals or agencies I can contact during a crisis	.  Suicide Prevention Lifeline Phones:	Suicide and Crisis Lifeline, call 118  .	Suicide Prevention Lifeline Phone: 9-575-724- AJRH (8066)  Environment Safety 1:	Limit or avoid phone  Environment Safety 2:	Try to find ways to keep distracted - looking for work  The one thing that is most important to me and worth living for is:	my future family, starting my own family Please make sure to follow up with your primary care doctor in 3 days.    Please make sure to keep up the appointment that you have already established with the outpatient mental health clinic.      Safety Plan:  Step 1: Identify warning signs (thoughts, images, mood, situation, behavior) that a crisis may be developing	.  Warning Sign 1:	seeing others who are ill  Warning Sign 2:	when I see an accident  Step 2: Identify internal coping strategies - Things I can do to take my mind off my problems without contacting another person (relaxation technique, physical activity)	.  Internal Coping Strategy 1:	meditation  Internal Coping Strategy 2:	try to reject thoughts and say that this is not what I want  Internal Coping Strategy 3:	prayer  Step 3: People and social settings that provide distraction	.  Name:	  Name:	Mother  Place:	Home  Step 4: People whom I can ask for help	.  Name:	  Name:	Mother  Step 5: Professionals or agencies I can contact during a crisis	.  Suicide Prevention Lifeline Phones:	Suicide and Crisis Lifeline, call 848  .	Suicide Prevention Lifeline Phone: 1-323-779- JJZU (0607)  Environment Safety 1:	Limit or avoid phone  Environment Safety 2:	Try to find ways to keep distracted - looking for work  The one thing that is most important to me and worth living for is:	my future family, starting my own family

## 2024-01-08 NOTE — ED PROVIDER NOTE - OBJECTIVE STATEMENT
33-year-old female with a past medical history of hypothyroidism and GERD who presented to ED requesting psych evaluation.  Reports that she has been having fear of dying for the past 10 days.  Reports that she thinks she can die anytime and has been very concerned.  Denies current thoughts of HI and SI and history of HI or SI.  Denies drug use and alcohol use.  Denies peer discomfort elsewhere

## 2024-01-08 NOTE — ED BEHAVIORAL HEALTH ASSESSMENT NOTE - REFERRAL / APPOINTMENT DETAILS
January 19th, 10 am at [facility] January 19th, 10 am at [facility] January 19th, 10 am at [facility], [address] [place] [address]. January 19th, 10 am at [facility], [address] [place] [address].

## 2024-01-08 NOTE — ED BEHAVIORAL HEALTH ASSESSMENT NOTE - RISK ASSESSMENT
Protective factors are patient's responsibility to family, support of family, no access to lethal means, no history of suicide attempts, identifies reasons for living, future plans, housing stability.    Risk factors include no connection to outpatient treatment, worsening ocd.    Patient is at low risk of suicide and protective factors mitigate risk factors at this time. Patient has no active suicidal ideation - no intent, or plan.

## 2024-01-08 NOTE — ED BEHAVIORAL HEALTH ASSESSMENT NOTE - VIOLENCE PROTECTIVE FACTORS:
Residential stability Residential stability/Relationship stability/Sobriety/Insight into violence risk and need for management/treatment

## 2024-01-08 NOTE — ED BEHAVIORAL HEALTH ASSESSMENT NOTE - NSBHATTESTCOMMENTATTENDFT_PSY_A_CORE
This writer reviewed the above assessment, made edits where appropriate, and agree with the recs/plan.

## 2024-01-08 NOTE — ED BEHAVIORAL HEALTH ASSESSMENT NOTE - NSBHMSEBEHAV_PSY_A_CORE
Khadra Burciaga 182 Patient Status:  Inpatient   Age/Gender 62year old male MRN [id number]   [facility] 3SW-A Attending Mariela Han MD   Hosp Day # 0 PCP PHYSICIAN NONSTAFF       Anesthesia Post-op Note    Procedure(s):  Ce
Cooperative

## 2024-01-08 NOTE — ED BEHAVIORAL HEALTH ASSESSMENT NOTE - NSBHMSEAFFRANGE_PSY_A_CORE
Telephone Encounter by Mariana Stock at 06/05/17 11:46 AM     Author:  Mariana Stock Service:  (none) Author Type:       Filed:  06/05/17 11:46 AM Encounter Date:  6/5/2017 Status:  Signed     :  Mariana Stock ()            Scheduled with SS[EN1.1M]      Revision History        User Key Date/Time User Provider Type Action    > EN1.1 06/05/17 11:46 AM Mariana Stock  Sign    M - Manual            
Telephone Encounter by Sofia Sofia at 06/05/17 07:59 AM     Author:  Sofia Sofia Service:  (none) Author Type:  Patient      Filed:  06/05/17 07:59 AM Encounter Date:  6/5/2017 Status:  Signed     :  Sofia Sofia (Patient )       From: Becky Swift  Sent: 6/5/2017  7:29 AM CDT  Subject: Request an Appointment    Request submitted by Becky Swift [[id number]] on 6/5/2017 at 7:29:20 AM  Form Title: Request an Appointment  Submitted Data  ----------------------------------------          From: Becky Swift        Request appt with: Tad Silverman MD [[id number]]  in [facility] / [place]        Would accept with: No one else        Preferred date range: 6/5/2017 thru 6/9/2017        Preferred times: Mon-Morning, Mon-Evening Tues-Morning, Tues-Evening    Thur-Morning, Thur-Evening Fri-Morning, Fri-Afternoon Sat-Morning         Reason for visit:       Congestion and painful coughs       Revision History        Date/Time User Provider Type Action    > 06/05/17 07:59 AM Sofia Sofia Patient  Sign    Attribution information within the note text is not available.            
Full

## 2024-01-08 NOTE — BH SAFETY PLAN - SUICIDE PREVENTION LIFELINE PHONES
Suicide Prevention Lifeline Phone: [phone]- TALK (8594) Suicide Prevention Lifeline Phone: [phone]- TALK (1958)

## 2024-01-08 NOTE — ED ADULT NURSE NOTE - NSFALLUNIVINTERV_ED_ALL_ED
Bed/Stretcher in lowest position, wheels locked, appropriate side rails in place/Call bell, personal items and telephone in reach/Instruct patient to call for assistance before getting out of bed/chair/stretcher/Non-slip footwear applied when patient is off stretcher/Lynn to call system/Physically safe environment - no spills, clutter or unnecessary equipment/Purposeful proactive rounding/Room/bathroom lighting operational, light cord in reach Bed/Stretcher in lowest position, wheels locked, appropriate side rails in place/Call bell, personal items and telephone in reach/Instruct patient to call for assistance before getting out of bed/chair/stretcher/Non-slip footwear applied when patient is off stretcher/Rhodelia to call system/Physically safe environment - no spills, clutter or unnecessary equipment/Purposeful proactive rounding/Room/bathroom lighting operational, light cord in reach

## 2024-01-08 NOTE — ED PROVIDER NOTE - PHYSICAL EXAMINATION
CONSTITUTIONAL: Well-appearing; in no apparent distress.   ENT: Hearing is intact with good acuity to spoken voice.  Patient is speaking clearly, not muffled and airway is intact.   RESPIRATORY: No signs of respiratory distress. Lung sounds are clear in all lobes bilaterally without rales, rhonchi, or wheezes.  CARDIOVASCULAR: Regular rate and rhythm.   GI: Abdomen is soft, non-tender, and without distention. Bowel sounds are present and normoactive in all four quadrants. No masses are noted.   gait noted.  NEURO: A & O x 3. Normal speech. No focal deficit.  PSYCHOLOGICAL: Appropriate mood and affect.

## 2024-01-08 NOTE — ED PROVIDER NOTE - PROGRESS NOTE DETAILS
Pt signed out to Dr. Mccann pending med clearance and psyc eval. Patient has been evaluated by telepsych and has been cleared by telepsych.  Patient has been provided outpatient follow-up appointment was no further question.  Patient is stable for discharge.

## 2024-01-08 NOTE — ED ADULT NURSE NOTE - NS ED BHA NUR THOUGHT PROCESS
SURGEON: NIKOLAI Cabrales DPM, FACFAS    ASSISTANT: none    PREOP DIAGNOSIS: 1. Foreign body abscess left foot    POSTOP DIAGNOSIS: same as above    PROCEDURE: 1. I&D left foot    PATHOLOGY: none    ANESTHESIA: Local MAC    HEMOSTASIS: Pneumatic ankle Tourniquet 250 psi    INJECTABLE: 7 cc Buffered 1% Lidocaine plain; 5 cc 0.5% Marcaine plain    BLOOD LOSS: 10 cc.    CONDITION: Vital signs are stable and vascular status intact the the lower extremities.       Normal/Making sense

## 2024-01-08 NOTE — ED BEHAVIORAL HEALTH ASSESSMENT NOTE - SAFETY PLAN ADDT'L DETAILS
Safety plan discussed with.../Education provided regarding environmental safety / lethal means restriction/Provision of National Suicide Prevention Lifeline 1-635-311-YSMQ (3324) Safety plan discussed with.../Education provided regarding environmental safety / lethal means restriction/Provision of National Suicide Prevention Lifeline 8-698-500-BZWA (0361)

## 2024-01-08 NOTE — ED BEHAVIORAL HEALTH ASSESSMENT NOTE - NSBHATTESTBILLING_PSY_A_CORE
[id number] diagnostic evaluation with medical services [id number]-Drqldkrhvbr diagnostic evaluation with medical services

## 2024-01-09 VITALS
SYSTOLIC BLOOD PRESSURE: 111 MMHG | DIASTOLIC BLOOD PRESSURE: 73 MMHG | HEART RATE: 78 BPM | OXYGEN SATURATION: 100 % | RESPIRATION RATE: 18 BRPM

## 2024-01-19 ENCOUNTER — OUTPATIENT (OUTPATIENT)
Dept: OUTPATIENT SERVICES | Facility: HOSPITAL | Age: 34
LOS: 1 days | End: 2024-01-19
Payer: SELF-PAY

## 2024-01-19 ENCOUNTER — APPOINTMENT (OUTPATIENT)
Dept: PSYCHIATRY | Facility: CLINIC | Age: 34
End: 2024-01-19

## 2024-01-19 DIAGNOSIS — F33.1 MAJOR DEPRESSIVE DISORDER, RECURRENT, MODERATE: ICD-10-CM

## 2024-01-19 PROBLEM — Z00.00 ENCOUNTER FOR PREVENTIVE HEALTH EXAMINATION: Status: ACTIVE | Noted: 2024-01-19

## 2024-01-19 PROCEDURE — 90791 PSYCH DIAGNOSTIC EVALUATION: CPT

## 2024-01-20 DIAGNOSIS — F33.1 MAJOR DEPRESSIVE DISORDER, RECURRENT, MODERATE: ICD-10-CM

## 2024-01-22 PROBLEM — E03.9 HYPOTHYROIDISM, UNSPECIFIED: Chronic | Status: ACTIVE | Noted: 2024-01-08

## 2024-01-22 PROBLEM — K21.9 GASTRO-ESOPHAGEAL REFLUX DISEASE WITHOUT ESOPHAGITIS: Chronic | Status: ACTIVE | Noted: 2024-01-08

## 2024-01-23 ENCOUNTER — APPOINTMENT (OUTPATIENT)
Dept: PSYCHIATRY | Facility: CLINIC | Age: 34
End: 2024-01-23
Payer: SELF-PAY

## 2024-01-23 ENCOUNTER — OUTPATIENT (OUTPATIENT)
Dept: OUTPATIENT SERVICES | Facility: HOSPITAL | Age: 34
LOS: 1 days | End: 2024-01-23
Payer: SELF-PAY

## 2024-01-23 DIAGNOSIS — Z91.09 OTHER ALLERGY STATUS, OTHER THAN TO DRUGS AND BIOLOGICAL SUBSTANCES: ICD-10-CM

## 2024-01-23 DIAGNOSIS — Z86.39 PERSONAL HISTORY OF OTHER ENDOCRINE, NUTRITIONAL AND METABOLIC DISEASE: ICD-10-CM

## 2024-01-23 DIAGNOSIS — F41.1 GENERALIZED ANXIETY DISORDER: ICD-10-CM

## 2024-01-23 PROCEDURE — 99205 OFFICE O/P NEW HI 60 MIN: CPT | Mod: 95

## 2024-01-24 DIAGNOSIS — F41.1 GENERALIZED ANXIETY DISORDER: ICD-10-CM

## 2024-01-24 PROBLEM — Z91.09 HISTORY OF ENVIRONMENTAL ALLERGIES: Status: RESOLVED | Noted: 2024-01-24 | Resolved: 2024-01-24

## 2024-01-24 PROBLEM — Z86.39 HISTORY OF HYPOTHYROIDISM: Status: RESOLVED | Noted: 2024-01-24 | Resolved: 2024-01-24

## 2024-01-24 RX ORDER — LEVOTHYROXINE SODIUM 137 UG/1
TABLET ORAL
Refills: 0 | Status: ACTIVE | COMMUNITY

## 2024-01-24 NOTE — REASON FOR VISIT
[Home] : at home, [unfilled] , at the time of the visit. [Other Location: e.g. Home (Enter Location, City,State)___] : at [unfilled] [[facility] Provider/Facility] : [facility] Provider/Facility [Patient] : Patient [Other:___] : [unfilled] [FreeTextEntry2] : Psychiatric evaluation [FreeTextEntry1] : OCD

## 2024-01-24 NOTE — PHYSICAL EXAM
ACUTE OT Evaluation  (New orders received for OT eval from MD for [facility] and due to Physiatry consult)      Pt seen on 4EF nursing unit.                                                          Frequency Comments: M-F (Physiatry consult)     Admitting complaint:: Shortness of breath [R06.02]  Hypoxia [R09.02]  Pleural effusion on right [J90]  Supratherapeutic INR [R79.1]  Pneumonia of right lower lobe due to infectious organism (CMS/HCC) [J18.1]  Suspected COVID-19 virus infection [Z20.828]                                                                                         Precautions  Other Precautions: no longer in isolation (05/18/20 0924)    Co-morbidities:   Patient Active Problem List   Diagnosis   • Murmur   • Atrial fibrillation (CMS/HCC)   • Gastroesophageal reflux disease without esophagitis   • Type 2 diabetes mellitus without complication, without long-term current use of insulin (CMS/HCC)   • AMD (age related macular degeneration)   • Meningioma (CMS/HCC)   • High cholesterol   • Medtronic Dual Pacemaker   • Warfarin anticoagulation   • Allergy to statin medication   • Encounter for therapeutic drug level monitoring - Digoxin   • Encounter for therapeutic drug monitoring   • Long term (current) use of anticoagulants   • Pneumonia due to COVID-19 virus       ASSESSMENT: OT eval completed. Pt is an 77 y/o female admitted to [facility] w/ chest discomfort/shortness of breath. Pt lives in an independent senior apartment and reports modified independence with ADLs and ambulation at baseline. Has assistance from daughter for money management due to low vision. This date, pt supv w/ 2WW for ambulation to and from bathroom. Supv for seated grooming/bathing for safety. Min A for upper and lower body dressing. Assist needed primarily to manage around lines/chest tube. Pt scored 24/30 on SLUMS and had some of the little difficulty she had with the screen was due to low vision and inability to see shapes for one of  the questions. The patient will benefit from continued skilled OT to address these deficits.     Task Modification: clinical decision making of moderate complexity, minimal to moderate task modification    See Flowsheet row data below for session detail and goals.     EDUCATION:  The patient was educated on the role of OT in the acute care setting. The plan of care and goals were discussed and  Needs reinforcement      RECOMMENDATIONS FOR DISCHARGE:  Recommendations for Discharge: OT WI: Post acute therapy (05/21/20 1005)    OT Identified Barriers to Discharge: medical status     PT/OT Mobility Equipment for Discharge: owns 4ww, no anticipated needs (05/21/20 1005)  PT/OT ADL Equipment for Discharge: continue to assess; owns shower chair, grab bars (05/21/20 1005)    Assistance needed when returning home:   Physiatry consult pending at this time, will await for Physiatry recommendations.     ICU Mobility Assesment (PERME):         PLAN: Continue skilled OT, including the following Treatment Interventions: ADL retraining;Functional transfer training;UE strengthening/ROM;Endurance training;Patient/Family training;Equipment eval/education;Compensatory technique education (05/21/20 1005)     Treatment Plan for Next Session: Lower body dressing, toileting                                                          SUBJECTIVE: Patient's Personal Goal: To return home (05/21/20 1005)   Subjective: Pt agreeable to session. \"I was hoping to wash up.\" (05/21/20 1005)  Subjective/Objective Comments: MARISA chapa session and present at start of session. Pt seated in recliner with needs met, call light in reach, and chair alarm activated at end of session. (05/21/20 1005)    OBJECTIVE:Basic Lines: Capped IV;Telemetry (05/21/20 1005)  Complex Lines: Chest tubes with suction (05/21/20 1005)  Safety Measures: Alarms (05/21/20 1005)    RN reported Vega Fall Scale Score: 60       Last 24 hours of Functional Data     ADLs  Self  Cares/ADL's  Grooming Assistance: Supervision;Sitting at sink (05/21/20 1005)  Grooming/Oral Hygiene Deficit: Wash/dry face (05/21/20 1005)  Bathing Assistance: Supervision (05/21/20 1005)  Bathing Deficit: Chest;Right arm;Left arm;Abdomen;Perineal area;Buttocks;Increased time to complete (05/21/20 1005)  Upper Body Dressing Assistance: Minimal Assist (Min) (05/21/20 1005)  Upper Body Dressing Deficit: Thread RUE;Thread LUE;Pull around back;Pull down in back;Fasteners (05/21/20 1005)  Lower Body Clothing Assistance: Minimal Assist (Min) (05/21/20 1005)  Lower Body Dressing Deficit: Thread RLE into underwear;Thread LLE into underwear;Pull up over hips (05/21/20 1005)  Self Cares/ADL's Comments #1: Supv for seated grooming/bathing for safety. Min A for upper and lower body dressing with assist needed primarily to manage around lines. (05/21/20 1005)    Household mobility  Household Mobility  Sit to Stand: Supervision (05/21/20 1005)  Stand to Sit: Supervision (05/21/20 1005)  Transfer Equipment: gait belt, 2WW (05/21/20 1005)  Sitting - Static: Independent (05/21/20 1005)  Sitting - Dynamic: Supervision (05/21/20 1005)  Standing - Static: Supervision (05/21/20 1005)  Standing - Dynamic: Supervision (05/21/20 1005)  Household Mobility Comments #1: Supv w/ 2WW for ambulation to and from bathroom. Assist needed for safe management of chest tubes. (05/21/20 1005)    Other Interventions  Other Interventions 1: Educated on role of OT, plan of care, importance of daily activity. (05/21/20 1005)    Home Management       Tolerance  OT Activity Tolerance  Activity Tolerance: 1:1 Activity to rest (05/21/20 1005)  Activity Tolerance Comments: Fair/fair + (05/21/20 1005)    Cognition  Communication/Cognition  Communication: Clear speech;Appropriate for developmental age (05/21/20 1005)  Overall Cognitive Status: Within Functional Limits (05/21/20 1005)  SLUMS - Full Test: Yes (05/21/20 1005)  SLUMS: Pt scored 24/30 on SLUMS  Examination, falling in scoring range suggestive of \"mild neurocognitive decline\". Pt demonstrated mild difficulty with short-term memory and object recall with interference. Pt unable to complete shape identification due to visual deficits, but was able to state correctly that a triangle had 3 sides and a square has 4 sides. Anticipate if pt's vision were intact she would have been able to answer correctly. Pt reports daughter already assists with checkbook/money management at baseline due to low vision. (05/21/20 1005)  SLUMS Interpretation - High School Education: 21-26 Mild Neuro Cognitive Impairment (05/21/20 1005)    Patient's Personal Goal: To return home (05/21/20 1005)    Therapy Goals:    Goals  Short Term Goals to Be Reviewed On: 05/28/20 (05/21/20 1005)  Short Term Goals Are The Same as Discharge Goals: Yes (05/21/20 1005)  Goal Agreement: Patient agrees with goals and treatment plan (05/21/20 1005)  Grooming Discharge Goal 1: Modified independent  (05/21/20 1005)  Bathing Discharge Goal 1: Modified independent full body (05/21/20 1005)  Dressing Discharge Goal 1: Modified independent upper body (05/21/20 1005)  Dressing Discharge Goal 2: Modified independent lower body (05/21/20 1005)  Toileting Discharge Goal 1: Modified independent (05/21/20 1005)    Total Treatment Time:  OT Time Spent: 70 minutes (05/21/20 1005)      See OT flowsheet for full details regarding the OT therapy provided.   [Average] : average [Cooperative] : cooperative [Anxious] : anxious [Constricted] : constricted [Clear] : clear [Obsessional] : obsessional [Linear/Goal Directed] : linear/goal directed [WNL] : within normal limits

## 2024-01-24 NOTE — PLAN
[Admit to Program     (Add Program Admission information to a new column in the Admit/Discharge Flowsheet)] : Admit to program [Every ___ week(s)] : Psychotherapy: Every [unfilled] week(s) [Individual Therapy] : Individual Therapy [FreeTextEntry4] : Start Prozac at 10mg daily for one week, then increase to 20mg Ativan as a prn for episodes of intense anxiety Therapy referral RTC 2 weeks

## 2024-01-24 NOTE — DISCUSSION/SUMMARY
[Low acute suicide risk] : Low acute suicide risk [FreeTextEntry1] : Pt has had ego-dystonic thoughts about dying, but she denies ever having any wish to die or thoughts of suicide. [Denied] : Denied [Yes: Details:___] : Yes: [unfilled] [No] : No [Does patient use tobacco products?] : Patient does not use tobacco products [Does patient use medical marijuana?] : Patient does not use medical marijuana. [de-identified] : Medical work-up done in ER on 1/8/24

## 2024-01-24 NOTE — SOCIAL HISTORY
[FreeTextEntry1] : Lives with her  and his mother. No children, but they are considering starting IVF in coming months. Worked as a  until getting  about a year ago. Denies any alcohol or substance use.

## 2024-01-24 NOTE — REASON FOR VISIT
08-Mar-2017 16:51 [Home] : at home, [unfilled] , at the time of the visit. [Other Location: e.g. Home (Enter Location, City,State)___] : at [unfilled] [[facility] Provider/Facility] : [facility] Provider/Facility [Patient] : Patient [Other:___] : [unfilled] [FreeTextEntry2] : Psychiatric evaluation [FreeTextEntry1] : OCD

## 2024-01-24 NOTE — PHYSICAL EXAM
[Average] : average [Cooperative] : cooperative [Anxious] : anxious [Constricted] : constricted [Clear] : clear [Linear/Goal Directed] : linear/goal directed [Obsessional] : obsessional [WNL] : within normal limits

## 2024-01-24 NOTE — DISCUSSION/SUMMARY
[Low acute suicide risk] : Low acute suicide risk [FreeTextEntry1] : Pt has had ego-dystonic thoughts about dying, but she denies ever having any wish to die or thoughts of suicide. [Denied] : Denied [Yes: Details:___] : Yes: [unfilled] [No] : No [Does patient use tobacco products?] : Patient does not use tobacco products [Does patient use medical marijuana?] : Patient does not use medical marijuana. [de-identified] : Medical work-up done in ER on 1/8/24

## 2024-01-24 NOTE — HISTORY OF PRESENT ILLNESS
[FreeTextEntry1] : MD EVALUATION:  Pt is 32 yo female with no hx of psychiatric treatment.  She presented to the ED on 1/8/24 due to being overwhelmed by intrusive thoughts.  The following assessment was written at that time:  "Patient presents alert and oriented to person, place, time and situation. Easily engages in interview. States that she has intrusive thoughts that have become distressing and difficult to distract from. She has thoughts that if she does not pray or do certain things in particular ways (for ex not placing a cup in a specific way), something bad will happen to herself or loved ones. She describes intense worry for her  and brother (lives in [facility]). States she is unable to turn off her brain and feels a burning sensation in the back of her head. To distract herself and busy her time, she has begun to watch her phone often and when she sees murders or accidents and has mental images that her loved ones are dying. Recently, pt states that she has been so disturbed by her thoughts, that she has started to think that it is better for her to go to sleep and not wake up. However, she denies suicide plan or true intent. Endorses depressed mood, feeling sad, hopeless, amotivation, low energy, passive suicidal ideation; denies issues with sleep or appetite. Also describes anxiety and not able to calm her worries. Denies AVH, paranoia.  Patient states that she used to work as a  and live a very busy life with travel and work. After getting  about 2 years ago, she has found it difficult to be busy; her  is often working and she finds herself using her phone a lot. The pt was offered voluntary psychiatric admission, but declined, and states she is amenable to outpt referrals."   Back to 1/23/24 Assessment:  Pt endorses hx above, though she adamantly denies ever having even passive suicidal ideation today.  She also notes that she has not any recurrences of the extreme anxiety she had on that day. She has been feeling more hopeful, as she is accessing the treatment she needs.  She has done a lot of reading about OCD since the ER visit, and she feels certain that this dx is correct.  Pt thinks she has had lower level of sx for years.   She was able to keep herself busy and distracted, however, and this mitigated the level of distress she felt.  Since she was  a year ago, she has been spending a lot of time alone, and this has resulted in a significant increase in her sx.  They had become more severe still over the last couple of months.  Pt has many behaviors that she does in order to prevent something bad from happening (like a family member dying).  The behaviors include checking and grooming rituals. She gets excessive anxiety with a variety of physical sx  when she does not do the behaviors.  As noted, she has also been having depressive sx over the past month, in part due to frustration with the OCD sx. She reports low mood, poor energy and motivation, decreased appetite and wish to isolate.  Sleep has been preserved.  Again, she adamantly denies having any wish to die. She denies any hx of mood elevation.  [FreeTextEntry2] : No previous psychiatric treatment. No hx of any self-harm. [FreeTextEntry3] : No med trial.

## 2024-01-29 NOTE — SOCIAL HISTORY
[FreeTextEntry1] : Legal Status  Does individual Served have a Legal Guardian, rep Payee or Conservatorship? [X ] No [ ] Yes - Details: [ ]  Legal Involvement history Does the individual have a history of or current involvement with the legal system? [X ] No [ ] Yes - Details: [ ]   Services Have you ever served in the ? 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 [ ]  Meaningful Activities: [ Taking care of animals ]   Spiritual Assessment Tool - MARIA L MORIN What is your dawn or belief? [ ] Do you consider yourself spiritual or Latter-day? [ ] Is there something you believe in that gives meaning to your life? [ ] I: Is it important in your life? [ ] What influence does it have on how you take care of yourself? [ ] How have your beliefs influenced your behavior during this illness? What role do your beliefs play in regaining your health? [ ] C. Are you part of a spiritual or Latter-day community? [ ] Is this of support to you and how? [ ] Is there a person or group of people you really love or who are really important to you? [ ] H. We have been discussing your belief and supports. What else gives you internal support?[ ] What are your sources of hope, strength, comfort and peace? [ ] What do you hold on to during difficult times? [ ] what sustains you and keeps you going? [ ] A. How would you like me, your healthcare provider, to address these issues in your healthcare? [ ]  SMOKING CESSATION Do you Smoke?                              [ ] Yes [ X] No Do you want to quit? [ ] Yes [ ] No -ASK  Number of cigatettes   [ ] cigars [ ]  pipe bowls [ ]  per day  Number of ST cans/ pouches per week [ ]  Number of years used [ ]  How soon after you wake up do you use tobacco?  [ ] within 30 minutes      [ ] more than 30 minutes  Previous quit attempts:  # of attempts [ ] longest quit period [ ] methods(s) used [ ] How long ago was last attempt to quit  [ ] years [ ] months  Reasons for wanting to quit[ ] -ADVISE   about the oral benefits of quitting -ASSESS   willingness to make a quit attempt (Stage of Change)    [ ] Precontemplation (Stop here & Reassess next visit) [ ] Contemplation [ ] Preparation   -ASSIST    (depending on stage of change)  Self-Help Pamphlets & Materials  List of local community group/individual quit programs and phone help lines  Encourage a quit date (for those who are ready)  Pharmacotherapy: Nicotine gum/ Lozenge/ Patch/ Inhaler/NS/Zyban/ Chantix   RX: [ ]  () -ARRANGE  Follow up if set a quit date (with permission) Quit Date: [ ]  Phone calls or visits:  [ ] Week 1-2  Months   [ ] 1   [ ] 3   [ ] 6   [ ] 12   -Yearly Reassessment    [ ] No Changes of Smoking [ ] Change of Smoking Habit (Non-Smoker to Smoker/ Smoker to Non Smoker) (If there is change from Non Smoker to Smoker, please fill out new BRIEF TOBACCO CESSATION INTERVENTION FORM) Date of Yearly Reassessment : [ ] Comments:  [ ]

## 2024-01-29 NOTE — REASON FOR VISIT
[Patient preference] : as per patient preference [[facility] Provider/Facility] : [facility] Provider/Facility [FreeTextEntry4] : 10am [FreeTextEntry5] : English [FreeTextEntry6] : Malathi [FreeTextEntry2] : Increased feelings of Anxiety [FreeTextEntry1] : Pt. reports having uncomfortable thought's regarding death for 2 weeks prior to ER visit.

## 2024-01-29 NOTE — RISK ASSESSMENT
[Clinical Interview] : Clinical Interview [No] : No [No known suicide factors] : No known suicide factors [Identifies reasons for living] : identifies reasons for living [Supportive social network of family or friends] : supportive social network of family or friends [Roman Catholic beliefs] : Jainism beliefs [None in the patient's lifetime] : None in the patient's lifetime [No known risk factors] : No known risk factors

## 2024-01-29 NOTE — HISTORY OF PRESENT ILLNESS
[FreeTextEntry1] : Pt. reports no history of Psychiatic concerns or care.  Pt. reports an onset of intrusive thoughts about herself,  and brother.  She explained she has to pray a certain way or somethings will happen to her younger brother who she needs to protect and/or  who she recently wed.  She denied hx. of depression or anxiety in her life.  Malathi stated she notices she has to place items a certain way before she can move forward with task.  This behavior is recent also.  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  Pt. does seem isolated from outlets and her family.  Pt. reports she has not seen her biological mother.  Pt. relates to anxiety as she notices her 's family can be judgmental towards others.  Pt. denied feelings to self-harm.   Pt. went to the ER as she stated she 'could not turn off her brain.'

## 2024-01-29 NOTE — PSYCHOSOCIAL ASSESSMENT
[had nightmares about the event(s) or thought about the event(s) when you did not want] : did not have nightmares and/or unwanted thoughts about the events [tried hard not to think about the event(s) or went out of your way to avoid situations that reminded you of the event] : did not need to avoid thinking about events, did not need to avoid situations that might remind patient of events [has been constantly on guard, watchful, or easily startled] : has not been constantly on guard, watchful, or easily startled [felt numb or detached from people, activities, or your surrounding] : has not felt numb or detached from people, activities, or surroundings [felt guilty or unable to stop blaming yourself or others for the event(s) or any problems the event(s) may have caused] : has not felt guilty or unable to stop blaming self or others for event(s), or any problems the event(s) may have caused [No] : No

## 2024-01-29 NOTE — FAMILY HISTORY
[FreeTextEntry1] : Family composition: [ Pt. lives with  and Mother-in-law]  Family history and background [ Pt. moved to [place] 2 years ago and  in the last 18 months] Family relationship [ Pt. feels support by her family.  Pt. does not like judgmental nature of family towards others] Pertinent Family Medical, MH and Substance Use History including Adult Child of Alcoholic and child of substance abuse status; history of cancer and heart disease [ none known]

## 2024-01-29 NOTE — DISCUSSION/SUMMARY
[FreeTextEntry1] : Assessment Summary: [Pt. reports no history of Psychiatic concerns or care.  Pt. reports an onset of intrusive thoughts about herself,  and brother.  She explained she has to pray a certain way or somethings will happen to her younger brother who she needs to protect and/or  who she recently wed.  She denied hx. of depression or anxiety in her life.  Malathi stated she notices she has to place items a certain way before she can move forward with task.  This behavior is recent also.  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  Pt. does seem isolated from outlets and her family.  Pt. reports she has not seen her biological mother.  Pt. relates to anxiety as she notices her 's family can be judgmental towards others.  Pt. denied feelings to self-harm.   Pt. went to the ER as she stated she 'could not turn off her brain.']       Assessed Needs- Functional Domain [ Pt. seems to have a decline prioritizing personal needs and outlets since .  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 Her goal is to have a child.]      Recommendation:  [ ]      Medication Only [ ]     Individual therapy only [X ]     Medication and Individual therapy [ ]     Group therapy

## 2024-02-01 ENCOUNTER — NON-APPOINTMENT (OUTPATIENT)
Age: 34
End: 2024-02-01

## 2024-02-02 ENCOUNTER — NON-APPOINTMENT (OUTPATIENT)
Age: 34
End: 2024-02-02

## 2024-02-07 ENCOUNTER — APPOINTMENT (OUTPATIENT)
Dept: PSYCHIATRY | Facility: CLINIC | Age: 34
End: 2024-02-07

## 2024-02-20 ENCOUNTER — NON-APPOINTMENT (OUTPATIENT)
Age: 34
End: 2024-02-20

## 2024-02-20 DIAGNOSIS — F32.1 MAJOR DEPRESSIVE DISORDER, SINGLE EPISODE, MODERATE: ICD-10-CM

## 2024-02-20 DIAGNOSIS — F42.2 MIXED OBSESSIONAL THOUGHTS AND ACTS: ICD-10-CM

## 2024-02-20 RX ORDER — FLUOXETINE HYDROCHLORIDE 10 MG/1
10 CAPSULE ORAL
Qty: 30 | Refills: 0 | Status: DISCONTINUED | COMMUNITY
Start: 2024-01-23 | End: 2024-02-20

## 2024-02-20 RX ORDER — LORAZEPAM 0.5 MG/1
0.5 TABLET ORAL TWICE DAILY
Qty: 30 | Refills: 0 | Status: DISCONTINUED | COMMUNITY
Start: 2024-01-23 | End: 2024-02-20

## 2024-02-20 NOTE — PLAN
[Not applicable] : Not applicable [de-identified] : Pt does not wish to be engaged in any psychiatric treatment at this time.  She did not appear to be in any acute danger or distress at the time of her initial (and only) visits.

## 2024-02-20 NOTE — HISTORY OF PRESENT ILLNESS
[FreeTextEntry1] : Per eval:  "Pt is 32 yo female with no hx of psychiatric treatment. She presented to the ED on 1/8/24 due to being overwhelmed by intrusive thoughts. The following assessment was written at that time:  "Patient presents alert and oriented to person, place, time and situation. Easily engages in interview. States that she has intrusive thoughts that have become distressing and difficult to distract from. She has thoughts that if she does not pray or do certain things in particular ways (for ex not placing a cup in a specific way), something bad will happen to herself or loved ones. She describes intense worry for her  and brother (lives in [facility]). States she is unable to turn off her brain and feels a burning sensation in the back of her head. To distract herself and busy her time, she has begun to watch her phone often and when she sees murders or accidents and has mental images that her loved ones are dying. Recently, pt states that she has been so disturbed by her thoughts, that she has started to think that it is better for her to go to sleep and not wake up. However, she denies suicide plan or true intent. Endorses depressed mood, feeling sad, hopeless, amotivation, low energy, passive suicidal ideation; denies issues with sleep or appetite. Also describes anxiety and not able to calm her worries. Denies AVH, paranoia. Patient states that she used to work as a  and live a very busy life with travel and work. After getting  about 2 years ago, she has found it difficult to be busy; her  is often working and she finds herself using her phone a lot. The pt was offered voluntary psychiatric admission, but declined, and states she is amenable to outpt referrals." [FreeTextEntry2] : No previous psychiatric treatment. No hx of any self-harm.   [FreeTextEntry3] : None

## 2024-02-20 NOTE — PLAN
[Not applicable] : Not applicable [de-identified] : Pt does not wish to be engaged in any psychiatric treatment at this time.  She did not appear to be in any acute danger or distress at the time of her initial (and only) visits.

## 2024-02-20 NOTE — DISCUSSION/SUMMARY
[FreeTextEntry1] : Pt attended only the initial evaluations.  Medications (Prozac and Ativan) were recommended and pt agreed to a trial.  She was no-show for the follow-up and did not respond to outreach.  I was able to contact her several weeks after her admission.  She said she only took meds for a couple of days and did not like the way she felt. She was considering making a new appointment to explore other treatment possibilities, but she said she started doing other things to manage sx - e.g. meditation - and had been feeling much better as a result. She confirmed that she will not be continuing with treatment, but I let her know that she could re-contact clinic in the future, if she wished to try treatment again. She voiced understanding.

## 2024-02-20 NOTE — TREATMENT
[FreeTextEntry8] : Prozac and Ativan trials recommended, but pt stopped after a couple of doses. [de-identified] : Pt did not attend any therapy sessions. [de-identified] : None [de-identified] : Pt reported feeling better without treatment.

## 2024-02-20 NOTE — REASON FOR VISIT
[Other:___] : [unfilled] [FreeTextEntry9] : 1/23/24 [FreeTextEntry8] : 2/20/24 [FreeTextEntry1] : Pt decided she did not wish to pursue psychiatric treatment at this time. [FreeTextEntry2] : OCD and depression

## 2024-02-20 NOTE — TREATMENT
[FreeTextEntry8] : Prozac and Ativan trials recommended, but pt stopped after a couple of doses. [de-identified] : Pt did not attend any therapy sessions. [de-identified] : None [de-identified] : Pt reported feeling better without treatment.

## 2024-02-20 NOTE — HISTORY OF PRESENT ILLNESS
[FreeTextEntry1] : Per eval:  "Pt is 32 yo female with no hx of psychiatric treatment. She presented to the ED on 1/8/24 due to being overwhelmed by intrusive thoughts. The following assessment was written at that time:  "Patient presents alert and oriented to person, place, time and situation. Easily engages in interview. States that she has intrusive thoughts that have become distressing and difficult to distract from. She has thoughts that if she does not pray or do certain things in particular ways (for ex not placing a cup in a specific way), something bad will happen to herself or loved ones. She describes intense worry for her  and brother (lives in [facility]). States she is unable to turn off her brain and feels a burning sensation in the back of her head. To distract herself and busy her time, she has begun to watch her phone often and when she sees murders or accidents and has mental images that her loved ones are dying. Recently, pt states that she has been so disturbed by her thoughts, that she has started to think that it is better for her to go to sleep and not wake up. However, she denies suicide plan or true intent. Endorses depressed mood, feeling sad, hopeless, amotivation, low energy, passive suicidal ideation; denies issues with sleep or appetite. Also describes anxiety and not able to calm her worries. Denies AVH, paranoia. Patient states that she used to work as a  and live a very busy life with travel and work. After getting  about 2 years ago, she has found it difficult to be busy; her  is often working and she finds herself using her phone a lot. The pt was offered voluntary psychiatric admission, but declined, and states she is amenable to outpt referrals." [FreeTextEntry2] : No previous psychiatric treatment. No hx of any self-harm.   [FreeTextEntry3] : None

## 2024-10-15 ENCOUNTER — EMERGENCY (EMERGENCY)
Facility: HOSPITAL | Age: 34
LOS: 0 days | Discharge: HOME | End: 2024-10-16
Attending: EMERGENCY MEDICINE
Payer: MEDICAID

## 2024-10-15 VITALS
WEIGHT: 123.46 LBS | SYSTOLIC BLOOD PRESSURE: 124 MMHG | HEART RATE: 68 BPM | RESPIRATION RATE: 20 BRPM | OXYGEN SATURATION: 99 % | DIASTOLIC BLOOD PRESSURE: 84 MMHG | TEMPERATURE: 98 F | HEIGHT: 68 IN

## 2024-10-15 DIAGNOSIS — R07.89 OTHER CHEST PAIN: ICD-10-CM

## 2024-10-15 DIAGNOSIS — R06.02 SHORTNESS OF BREATH: ICD-10-CM

## 2024-10-15 DIAGNOSIS — E03.9 HYPOTHYROIDISM, UNSPECIFIED: ICD-10-CM

## 2024-10-15 DIAGNOSIS — R05.9 COUGH, UNSPECIFIED: ICD-10-CM

## 2024-10-15 PROCEDURE — 93010 ELECTROCARDIOGRAM REPORT: CPT | Mod: 76

## 2024-10-15 PROCEDURE — 84703 CHORIONIC GONADOTROPIN ASSAY: CPT

## 2024-10-15 PROCEDURE — 84484 ASSAY OF TROPONIN QUANT: CPT

## 2024-10-15 PROCEDURE — 83690 ASSAY OF LIPASE: CPT

## 2024-10-15 PROCEDURE — 71046 X-RAY EXAM CHEST 2 VIEWS: CPT

## 2024-10-15 PROCEDURE — 85025 COMPLETE CBC W/AUTO DIFF WBC: CPT

## 2024-10-15 PROCEDURE — 80053 COMPREHEN METABOLIC PANEL: CPT

## 2024-10-15 PROCEDURE — 83735 ASSAY OF MAGNESIUM: CPT

## 2024-10-15 PROCEDURE — 36415 COLL VENOUS BLD VENIPUNCTURE: CPT

## 2024-10-15 PROCEDURE — 85379 FIBRIN DEGRADATION QUANT: CPT

## 2024-10-15 PROCEDURE — 96374 THER/PROPH/DIAG INJ IV PUSH: CPT

## 2024-10-15 PROCEDURE — 99285 EMERGENCY DEPT VISIT HI MDM: CPT | Mod: 25

## 2024-10-15 PROCEDURE — 93005 ELECTROCARDIOGRAM TRACING: CPT

## 2024-10-15 PROCEDURE — 99285 EMERGENCY DEPT VISIT HI MDM: CPT

## 2024-10-15 RX ORDER — FAMOTIDINE 40 MG
20 TABLET ORAL ONCE
Refills: 0 | Status: COMPLETED | OUTPATIENT
Start: 2024-10-15 | End: 2024-10-15

## 2024-10-15 NOTE — ED ADULT NURSE NOTE - CCCP TRG CHIEF CMPLNT
DISCHARGE INSTRUCTIONS:       1.  Non weight bearing  with walker or 2 crutches until seen by Sam Bland MD   2.  Keep splint and dressings clean and dry.  Cover splint with a plastic bag and tape for showering.  3. Aspirin 325 mg ONCE daily for 4 weeks for DVT prophylaxis.  4. Keflex 500 mg THREE TIMES daily for 1 week for possible infection.  5.  Take no over the counter anti inflammatory medications like Advil or ibuprofen while taking Aspirin.  6. Take your pain medications as instructed and only as needed for pain.  7.  Follow up with Sam Bland MD in 2 weeks. Dr. Edwina's office will call you at home to make that appointment.   Contact our office if you have any post operative questions or concerns at [phone]  8.  Any medical questions should be directed to your Primary care Physician.            shortness of breath

## 2024-10-15 NOTE — ED ADULT NURSE NOTE - OBJECTIVE STATEMENT
pt stated she has been feeling sob x a few days, has a cough and thinks she has allergies to her pet bird

## 2024-10-16 VITALS
OXYGEN SATURATION: 100 % | DIASTOLIC BLOOD PRESSURE: 65 MMHG | HEART RATE: 68 BPM | TEMPERATURE: 98 F | RESPIRATION RATE: 18 BRPM | SYSTOLIC BLOOD PRESSURE: 104 MMHG

## 2024-10-16 LAB
ALBUMIN SERPL ELPH-MCNC: 4.4 G/DL — SIGNIFICANT CHANGE UP (ref 3.5–5.2)
ALP SERPL-CCNC: 56 U/L — SIGNIFICANT CHANGE UP (ref 30–115)
ALT FLD-CCNC: 7 U/L — SIGNIFICANT CHANGE UP (ref 0–41)
ANION GAP SERPL CALC-SCNC: 11 MMOL/L — SIGNIFICANT CHANGE UP (ref 7–14)
AST SERPL-CCNC: 15 U/L — SIGNIFICANT CHANGE UP (ref 0–41)
BASOPHILS # BLD AUTO: 0.05 K/UL — SIGNIFICANT CHANGE UP (ref 0–0.2)
BASOPHILS NFR BLD AUTO: 0.6 % — SIGNIFICANT CHANGE UP (ref 0–1)
BILIRUB SERPL-MCNC: <0.2 MG/DL — SIGNIFICANT CHANGE UP (ref 0.2–1.2)
BUN SERPL-MCNC: 10 MG/DL — SIGNIFICANT CHANGE UP (ref 10–20)
CALCIUM SERPL-MCNC: 9.6 MG/DL — SIGNIFICANT CHANGE UP (ref 8.4–10.5)
CHLORIDE SERPL-SCNC: 105 MMOL/L — SIGNIFICANT CHANGE UP (ref 98–110)
CO2 SERPL-SCNC: 24 MMOL/L — SIGNIFICANT CHANGE UP (ref 17–32)
CREAT SERPL-MCNC: 0.7 MG/DL — SIGNIFICANT CHANGE UP (ref 0.7–1.5)
D DIMER BLD IA.RAPID-MCNC: 152 NG/ML DDU — SIGNIFICANT CHANGE UP
EGFR: 115 ML/MIN/1.73M2 — SIGNIFICANT CHANGE UP
EOSINOPHIL # BLD AUTO: 0.3 K/UL — SIGNIFICANT CHANGE UP (ref 0–0.7)
EOSINOPHIL NFR BLD AUTO: 3.8 % — SIGNIFICANT CHANGE UP (ref 0–8)
GLUCOSE SERPL-MCNC: 94 MG/DL — SIGNIFICANT CHANGE UP (ref 70–99)
HCG SERPL QL: NEGATIVE — SIGNIFICANT CHANGE UP
HCT VFR BLD CALC: 37.2 % — SIGNIFICANT CHANGE UP (ref 37–47)
HGB BLD-MCNC: 12.3 G/DL — SIGNIFICANT CHANGE UP (ref 12–16)
IMM GRANULOCYTES NFR BLD AUTO: 0.1 % — SIGNIFICANT CHANGE UP (ref 0.1–0.3)
LIDOCAIN IGE QN: 47 U/L — SIGNIFICANT CHANGE UP (ref 7–60)
LYMPHOCYTES # BLD AUTO: 3.43 K/UL — HIGH (ref 1.2–3.4)
LYMPHOCYTES # BLD AUTO: 44 % — SIGNIFICANT CHANGE UP (ref 20.5–51.1)
MAGNESIUM SERPL-MCNC: 2.1 MG/DL — SIGNIFICANT CHANGE UP (ref 1.8–2.4)
MCHC RBC-ENTMCNC: 28.5 PG — SIGNIFICANT CHANGE UP (ref 27–31)
MCHC RBC-ENTMCNC: 33.1 G/DL — SIGNIFICANT CHANGE UP (ref 32–37)
MCV RBC AUTO: 86.1 FL — SIGNIFICANT CHANGE UP (ref 81–99)
MONOCYTES # BLD AUTO: 0.57 K/UL — SIGNIFICANT CHANGE UP (ref 0.1–0.6)
MONOCYTES NFR BLD AUTO: 7.3 % — SIGNIFICANT CHANGE UP (ref 1.7–9.3)
NEUTROPHILS # BLD AUTO: 3.44 K/UL — SIGNIFICANT CHANGE UP (ref 1.4–6.5)
NEUTROPHILS NFR BLD AUTO: 44.2 % — SIGNIFICANT CHANGE UP (ref 42.2–75.2)
NRBC # BLD: 0 /100 WBCS — SIGNIFICANT CHANGE UP (ref 0–0)
PLATELET # BLD AUTO: 189 K/UL — SIGNIFICANT CHANGE UP (ref 130–400)
PMV BLD: 11 FL — HIGH (ref 7.4–10.4)
POTASSIUM SERPL-MCNC: 4.1 MMOL/L — SIGNIFICANT CHANGE UP (ref 3.5–5)
POTASSIUM SERPL-SCNC: 4.1 MMOL/L — SIGNIFICANT CHANGE UP (ref 3.5–5)
PROT SERPL-MCNC: 6.9 G/DL — SIGNIFICANT CHANGE UP (ref 6–8)
RBC # BLD: 4.32 M/UL — SIGNIFICANT CHANGE UP (ref 4.2–5.4)
RBC # FLD: 12 % — SIGNIFICANT CHANGE UP (ref 11.5–14.5)
SODIUM SERPL-SCNC: 140 MMOL/L — SIGNIFICANT CHANGE UP (ref 135–146)
TROPONIN SAMPLING TIME: SIGNIFICANT CHANGE UP
TROPONIN T, HIGH SENSITIVITY RESULT: <6 NG/L — SIGNIFICANT CHANGE UP (ref 6–13)
WBC # BLD: 7.8 K/UL — SIGNIFICANT CHANGE UP (ref 4.8–10.8)
WBC # FLD AUTO: 7.8 K/UL — SIGNIFICANT CHANGE UP (ref 4.8–10.8)

## 2024-10-16 PROCEDURE — 71046 X-RAY EXAM CHEST 2 VIEWS: CPT | Mod: 26

## 2024-10-16 RX ADMIN — Medication 20 MILLIGRAM(S): at 00:15

## 2024-10-16 NOTE — ED PROVIDER NOTE - OBJECTIVE STATEMENT
34-year-old female history of hypothyroid presents with a dry cough x 1 week associated chest tightness.  No fever no leg pain or swelling no hypercoagulability no PE risk factors no family history of early cardiac MI or death.

## 2024-10-16 NOTE — ED PROVIDER NOTE - NSFOLLOWUPINSTRUCTIONS_ED_ALL_ED_FT
RETURN TO ED  FOR ANY NEW WORSENING OR CONCERNING SYMPTOMS TO YOU, ALSO AS WE DISCUSSED. WE ARE HERE AND HAPPY TO TAKE CARE OF YOU. OTHERWISE FOLLOW UP WITH YOUR PRIMARY DOCTOR IN 1-3 DAYS    Our Emergency Department Referral Coordinators will be reaching out to you in the next 24-48 hours from 9:00am to 5:00pm to schedule a follow up appointment. Please expect a phone call from the hospital in that time frame. If you do not receive a call or if you have any questions or concerns, you can reach them at   [phone]        Shortness of Breath, Adult  ImageShortness of breath is when a person has trouble breathing enough air, or when a person feels like she or he is having trouble breathing in enough air. Shortness of breath could be a sign of medical problem.    Follow these instructions at home:  Pay attention to any changes in your symptoms. Take these actions to help with your condition:    Do not smoke. Smoking is a common cause of shortness of breath. If you smoke and you need help quitting, ask your health care provider.  Avoid things that can irritate your airways, such as:    Mold.  Dust.  Air pollution.  Chemical fumes.  Things that can cause allergy symptoms (allergens), if you have allergies.    Keep your living space clean and free of mold and dust.  Rest as needed. Slowly return to your usual activities.  Take over-the-counter and prescription medicines, including oxygen and inhaled medicines, only as told by your health care provider.  Keep all follow-up visits as told by your health care provider. This is important.    Contact a health care provider if:  Your condition does not improve as soon as expected.  You have a hard time doing your normal activities, even after you rest.  You have new symptoms.  Get help right away if:  Your shortness of breath gets worse.  You have shortness of breath when you are resting.  You feel light-headed or you faint.  You have a cough that is not controlled with medicines.  You cough up blood.  You have pain with breathing.  You have pain in your chest, arms, shoulders, or abdomen.  You have a fever.  You cannot walk up stairs or exercise the way that you normally do.  This information is not intended to replace advice given to you by your health care provider. Make sure you discuss any questions you have with your health care provider

## 2024-10-16 NOTE — ED PROVIDER NOTE - CLINICAL SUMMARY MEDICAL DECISION MAKING FREE TEXT BOX
34-year-old female history of hypothyroid presents with a dry cough x 1 week associated chest tightness.  No fever no leg pain or swelling no hypercoagulability no PE risk factors no family history of early cardiac MI or death.  Patient well-appearing no distress respiratory clear to auscultation no lower extremity swelling) pulse bilateral equal abdomen soft nontender no pallor.  independent interpretation,   by myself Dr Real, of CXR -  no ptx no opacity not wide  no water bottle heart  I  Dr Real performed independent interpretation of EKG  -  nsr no stemi no acute ischemia  Labs resulted at the time of my review were noted to be within acceptable parameters  Including troponin and D-dimer.  Patient to be discharged from ED in well appearing condition. Any available test results were discussed with and printed  for patient.  Verbal instructions given, including instructions to return to ED immediately for any new, worsening, or concerning symptoms. Limitations of ED work up discussed.  Patient reports understanding of above with capacity and insight. Written discharge instructions additionally given, including follow-up plan.

## 2024-10-16 NOTE — ED PROVIDER NOTE - WHICH SHOWED
I  Dr Mccann performed independent interpretation of EKG  -  nsr no stemi no acute ischemia  independent interpretation,   by myself Dr Mccann, of CXR -  no ptx no opacity not wide  no water bottle heart

## 2024-10-16 NOTE — ED PROVIDER NOTE - PATIENT PORTAL LINK FT
You can access the FollowMyHealth Patient Portal offered by [facility] by registering at the following website: [email or web address]. By joining [employer]’s FollowMyHealth portal, you will also be able to view your health information using other applications (apps) compatible with our system.

## 2024-12-17 ENCOUNTER — EMERGENCY (EMERGENCY)
Facility: HOSPITAL | Age: 34
LOS: 0 days | Discharge: HOME | End: 2024-12-17
Attending: STUDENT IN AN ORGANIZED HEALTH CARE EDUCATION/TRAINING PROGRAM
Payer: COMMERCIAL

## 2024-12-17 VITALS
DIASTOLIC BLOOD PRESSURE: 97 MMHG | SYSTOLIC BLOOD PRESSURE: 140 MMHG | RESPIRATION RATE: 16 BRPM | OXYGEN SATURATION: 100 % | WEIGHT: 125 LBS | HEART RATE: 60 BPM | TEMPERATURE: 98 F

## 2024-12-17 PROCEDURE — 99284 EMERGENCY DEPT VISIT MOD MDM: CPT

## 2024-12-17 PROCEDURE — 96372 THER/PROPH/DIAG INJ SC/IM: CPT

## 2024-12-17 PROCEDURE — 99283 EMERGENCY DEPT VISIT LOW MDM: CPT | Mod: 25

## 2024-12-17 RX ORDER — KETOROLAC TROMETHAMINE 30 MG/ML
60 INJECTION INTRAMUSCULAR; INTRAVENOUS ONCE
Refills: 0 | Status: DISCONTINUED | OUTPATIENT
Start: 2024-12-17 | End: 2024-12-17

## 2024-12-17 RX ORDER — DIAZEPAM 10 MG/1
5 TABLET ORAL ONCE
Refills: 0 | Status: DISCONTINUED | OUTPATIENT
Start: 2024-12-17 | End: 2024-12-17

## 2024-12-17 RX ORDER — LIDOCAINE 40 MG/G
1 CREAM TOPICAL ONCE
Refills: 0 | Status: COMPLETED | OUTPATIENT
Start: 2024-12-17 | End: 2024-12-17

## 2024-12-17 RX ORDER — METHOCARBAMOL 500 MG/1
2 TABLET, FILM COATED ORAL
Qty: 30 | Refills: 0
Start: 2024-12-17 | End: 2024-12-21

## 2024-12-17 RX ORDER — KETOROLAC TROMETHAMINE 30 MG/ML
1 INJECTION INTRAMUSCULAR; INTRAVENOUS
Qty: 15 | Refills: 0
Start: 2024-12-17 | End: 2024-12-21

## 2024-12-17 RX ADMIN — LIDOCAINE 1 PATCH: 40 CREAM TOPICAL at 15:10

## 2024-12-17 RX ADMIN — KETOROLAC TROMETHAMINE 60 MILLIGRAM(S): 30 INJECTION INTRAMUSCULAR; INTRAVENOUS at 15:10

## 2024-12-17 RX ADMIN — DIAZEPAM 5 MILLIGRAM(S): 10 TABLET ORAL at 15:10

## 2024-12-17 NOTE — ED PROVIDER NOTE - NS_EDPROVIDERDISPOUSERTYPE_ED_A_ED
Attending Attestation (For Attendings USE Only)...
<-------- Click here to INCLUDE CoVID-19 Discharge Instructions

## 2024-12-17 NOTE — ED PROVIDER NOTE - PHYSICAL EXAMINATION
general: well appearing, comfortable  eyes: clear conjunctiva  abd: non tender, non distended, BS x 4, no CVA tenderness  msk: neck supple,+tenderness right trapezius, no crepitation ,  pelvis stable,  no sciatic notch tenderness, no foot drop, no vertebral tenderness, flexion / extension lumbar region in tact  skin: no rashes, swelling  neuro: alert, oriented , no motor or sensory deficits , gait steady

## 2024-12-17 NOTE — ED ADULT TRIAGE NOTE - PATIENT ON (OXYGEN DELIVERY METHOD)
[facility] [address] [place] [address] [address] [facility]  [address] [id number] [phone]               Thank you for choosing us for your health care visit with Gerhardt Guile, MD.  We are glad to serve you and happy to provide you wit Esomeprazole Magnesium 40 MG Cpdr   Take 1 capsule (40 mg total) by mouth daily. Commonly known as:  NEXIUM           fenofibrate micronized 200 MG Caps   Take 1 capsule by mouth daily.    Commonly known as:  LOFIBRA           Hydroxychloroquine Sulfate room air

## 2024-12-17 NOTE — ED PROVIDER NOTE - PATIENT PORTAL LINK FT
You can access the FollowMyHealth Patient Portal offered by [facility] by registering at the following website: [email or web address]. By joining [employer]’s FollowMyHealth portal, you will also be able to view your health information using other applications (apps) compatible with our system.

## 2024-12-17 NOTE — ED PROVIDER NOTE - OBJECTIVE STATEMENT
33 y/o female presents to the ED with right sided neck pain with muscle spasm after moving neck this am and feeling burning pain. no weakness or tingling to extremities. no difficulty swallowing. no rashes. no sore throat, ear pain or fevers. no relief with tylenol

## 2024-12-17 NOTE — ED ADULT NURSE NOTE - OBJECTIVE STATEMENT
Pt states to pain to R neck, causing her to not be able to turn her head towards the right. aox4. skin pwd. denies injury or trauma. moves all extremities. resp easy. MD to bedside. pending medicaitons.

## 2024-12-17 NOTE — ED PROVIDER NOTE - CLINICAL SUMMARY MEDICAL DECISION MAKING FREE TEXT BOX
pt with stiff neck on left side,    improved after medication no signs of cervical myelopathy/ radiculopathy    Appropriate medications for patient's presenting complaints were ordered and effects were reassessed. Patient's external records were reviewed    Escalation to admission and/or observation was considered.  Patient feels much better and is comfortable with discharge.  Appropriate follow-up was arranged.

## 2024-12-18 ENCOUNTER — APPOINTMENT (OUTPATIENT)
Dept: PAIN MANAGEMENT | Facility: CLINIC | Age: 34
End: 2024-12-18